# Patient Record
Sex: FEMALE | Race: OTHER | NOT HISPANIC OR LATINO
[De-identification: names, ages, dates, MRNs, and addresses within clinical notes are randomized per-mention and may not be internally consistent; named-entity substitution may affect disease eponyms.]

---

## 2018-09-04 ENCOUNTER — APPOINTMENT (OUTPATIENT)
Dept: CARDIOLOGY | Facility: CLINIC | Age: 68
End: 2018-09-04
Payer: MEDICARE

## 2018-09-04 ENCOUNTER — NON-APPOINTMENT (OUTPATIENT)
Age: 68
End: 2018-09-04

## 2018-09-04 VITALS
HEIGHT: 68 IN | OXYGEN SATURATION: 97 % | HEART RATE: 108 BPM | DIASTOLIC BLOOD PRESSURE: 70 MMHG | WEIGHT: 205 LBS | SYSTOLIC BLOOD PRESSURE: 110 MMHG | BODY MASS INDEX: 31.07 KG/M2

## 2018-09-04 DIAGNOSIS — M54.2 CERVICALGIA: ICD-10-CM

## 2018-09-04 PROCEDURE — 99214 OFFICE O/P EST MOD 30 MIN: CPT

## 2018-09-04 PROCEDURE — 93000 ELECTROCARDIOGRAM COMPLETE: CPT

## 2018-09-04 PROCEDURE — 93880 EXTRACRANIAL BILAT STUDY: CPT

## 2018-09-04 PROCEDURE — 93306 TTE W/DOPPLER COMPLETE: CPT

## 2018-09-04 RX ORDER — ECONAZOLE NITRATE 10 MG/G
1 CREAM TOPICAL
Qty: 85 | Refills: 0 | Status: COMPLETED | COMMUNITY
Start: 2018-04-18

## 2018-09-04 RX ORDER — AZITHROMYCIN 250 MG/1
250 TABLET, FILM COATED ORAL
Qty: 6 | Refills: 0 | Status: COMPLETED | COMMUNITY
Start: 2018-03-08

## 2018-09-04 RX ORDER — FLUTICASONE PROPIONATE 50 UG/1
50 SPRAY, METERED NASAL
Qty: 16 | Refills: 0 | Status: ACTIVE | COMMUNITY
Start: 2018-06-18

## 2018-09-21 ENCOUNTER — RESULT REVIEW (OUTPATIENT)
Age: 68
End: 2018-09-21

## 2019-10-15 ENCOUNTER — APPOINTMENT (OUTPATIENT)
Dept: CARDIOLOGY | Facility: CLINIC | Age: 69
End: 2019-10-15
Payer: MEDICARE

## 2019-10-15 VITALS
OXYGEN SATURATION: 98 % | HEIGHT: 68 IN | HEART RATE: 86 BPM | SYSTOLIC BLOOD PRESSURE: 110 MMHG | BODY MASS INDEX: 30.92 KG/M2 | WEIGHT: 204 LBS | DIASTOLIC BLOOD PRESSURE: 70 MMHG

## 2019-10-15 PROCEDURE — 99215 OFFICE O/P EST HI 40 MIN: CPT

## 2019-10-15 PROCEDURE — 93000 ELECTROCARDIOGRAM COMPLETE: CPT

## 2019-10-15 NOTE — PHYSICAL EXAM
[General Appearance - Well Developed] : well developed [Normal Appearance] : normal appearance [Well Groomed] : well groomed [General Appearance - Well Nourished] : well nourished [General Appearance - In No Acute Distress] : no acute distress [No Deformities] : no deformities [Eyelids - No Xanthelasma] : the eyelids demonstrated no xanthelasmas [Normal Conjunctiva] : the conjunctiva exhibited no abnormalities [No Oral Cyanosis] : no oral cyanosis [Normal Oral Mucosa] : normal oral mucosa [No Oral Pallor] : no oral pallor [Normal Jugular Venous A Waves Present] : normal jugular venous A waves present [Normal Jugular Venous V Waves Present] : normal jugular venous V waves present [No Jugular Venous Begum A Waves] : no jugular venous begum A waves [Exaggerated Use Of Accessory Muscles For Inspiration] : no accessory muscle use [Respiration, Rhythm And Depth] : normal respiratory rhythm and effort [Auscultation Breath Sounds / Voice Sounds] : lungs were clear to auscultation bilaterally [Heart Rate And Rhythm] : heart rate and rhythm were normal [Heart Sounds] : normal S1 and S2 [Murmurs] : no murmurs present [Abdomen Soft] : soft [Abdomen Tenderness] : non-tender [Abnormal Walk] : normal gait [Abdomen Mass (___ Cm)] : no abdominal mass palpated [Gait - Sufficient For Exercise Testing] : the gait was sufficient for exercise testing [Nail Clubbing] : no clubbing of the fingernails [Petechial Hemorrhages (___cm)] : no petechial hemorrhages [Cyanosis, Localized] : no localized cyanosis [] : no rash [Skin Color & Pigmentation] : normal skin color and pigmentation [No Venous Stasis] : no venous stasis [Skin Lesions] : no skin lesions [No Skin Ulcers] : no skin ulcer [No Xanthoma] : no  xanthoma was observed [Oriented To Time, Place, And Person] : oriented to person, place, and time [No Anxiety] : not feeling anxious [Affect] : the affect was normal [Mood] : the mood was normal

## 2019-10-15 NOTE — HISTORY OF PRESENT ILLNESS
[FreeTextEntry1] : Mrs. Brewster is followed for routine medical care in our office.\par Known history of hyperlipidemia and migraine headaches. She is being seen by neurology for her migraine headaches and is on Topamax and Imitrex as needed. Patient had evaluation done in the past because her sister has Brugada syndrome. She has had no prior history of coronary artery disease. She suffers from obesity and feels frustrated that despite her dieting she is unable to dramatically curb her weight gain.\par Also c/o occasional neck pinches when lying in bed at night, likely cervical arthritis.\par

## 2019-10-15 NOTE — DISCUSSION/SUMMARY
[___ Month(s)] : [unfilled] month(s) [FreeTextEntry1] : Labs reviewed, normal.Lengthy discussion regarding options for weight loss, patient requesting pharmacological aid. Patient already on Topamax for headaches will try limited course of Lorcaserin with a goal of 5% weight loss. Patient will continue dietary modification and exercise to whatever capacity she is able to followup in 12weeks for evaluation. Patient was told that this will not dramatically alter her body habitus.\par

## 2019-10-15 NOTE — CARDIOLOGY SUMMARY
[No Exercise Ind Arr] : no exercise induced arrhythmias [No Ischemia] : no Ischemia [No Symptoms] : no Symptoms [LVEF ___%] : LVEF [unfilled]% [___] : [unfilled]

## 2020-01-08 ENCOUNTER — MEDICATION RENEWAL (OUTPATIENT)
Age: 70
End: 2020-01-08

## 2020-02-18 ENCOUNTER — NON-APPOINTMENT (OUTPATIENT)
Age: 70
End: 2020-02-18

## 2020-02-18 ENCOUNTER — APPOINTMENT (OUTPATIENT)
Dept: CARDIOLOGY | Facility: CLINIC | Age: 70
End: 2020-02-18
Payer: MEDICARE

## 2020-02-18 VITALS
BODY MASS INDEX: 30.01 KG/M2 | SYSTOLIC BLOOD PRESSURE: 120 MMHG | HEART RATE: 83 BPM | DIASTOLIC BLOOD PRESSURE: 62 MMHG | OXYGEN SATURATION: 98 % | WEIGHT: 198 LBS | HEIGHT: 68 IN

## 2020-02-18 PROCEDURE — 99214 OFFICE O/P EST MOD 30 MIN: CPT

## 2020-02-18 PROCEDURE — 93000 ELECTROCARDIOGRAM COMPLETE: CPT

## 2020-02-18 RX ORDER — METRONIDAZOLE 7.5 MG/G
0.75 LOTION TOPICAL
Qty: 59 | Refills: 0 | Status: DISCONTINUED | COMMUNITY
Start: 2018-07-18 | End: 2020-02-18

## 2020-02-18 NOTE — DISCUSSION/SUMMARY
[___ Month(s)] : [unfilled] month(s) [FreeTextEntry1] : Atypical chest pain, likely musculoskeletal.  Will schedule exercise nuclear stress testing for further risk stratification.  Patient encouraged to continue dietary modification and continue monitoring her diet closely.  Labs from emergency room were reviewed with the patient and were otherwise normal.

## 2020-02-18 NOTE — PHYSICAL EXAM
[General Appearance - Well Developed] : well developed [Well Groomed] : well groomed [Normal Appearance] : normal appearance [General Appearance - Well Nourished] : well nourished [No Deformities] : no deformities [General Appearance - In No Acute Distress] : no acute distress [Normal Conjunctiva] : the conjunctiva exhibited no abnormalities [Eyelids - No Xanthelasma] : the eyelids demonstrated no xanthelasmas [Normal Oral Mucosa] : normal oral mucosa [No Oral Pallor] : no oral pallor [No Oral Cyanosis] : no oral cyanosis [Normal Jugular Venous A Waves Present] : normal jugular venous A waves present [Normal Jugular Venous V Waves Present] : normal jugular venous V waves present [No Jugular Venous Begum A Waves] : no jugular venous begum A waves [Respiration, Rhythm And Depth] : normal respiratory rhythm and effort [Exaggerated Use Of Accessory Muscles For Inspiration] : no accessory muscle use [Auscultation Breath Sounds / Voice Sounds] : lungs were clear to auscultation bilaterally [Heart Sounds] : normal S1 and S2 [Heart Rate And Rhythm] : heart rate and rhythm were normal [Murmurs] : no murmurs present [Abdomen Soft] : soft [Abdomen Tenderness] : non-tender [Abdomen Mass (___ Cm)] : no abdominal mass palpated [Abnormal Walk] : normal gait [Gait - Sufficient For Exercise Testing] : the gait was sufficient for exercise testing [Cyanosis, Localized] : no localized cyanosis [Nail Clubbing] : no clubbing of the fingernails [Petechial Hemorrhages (___cm)] : no petechial hemorrhages [] : no rash [Skin Color & Pigmentation] : normal skin color and pigmentation [No Venous Stasis] : no venous stasis [No Skin Ulcers] : no skin ulcer [Skin Lesions] : no skin lesions [No Xanthoma] : no  xanthoma was observed [Affect] : the affect was normal [Oriented To Time, Place, And Person] : oriented to person, place, and time [Mood] : the mood was normal [No Anxiety] : not feeling anxious

## 2020-02-18 NOTE — HISTORY OF PRESENT ILLNESS
[FreeTextEntry1] : Mrs. Brewster is a 68 yo female followed for h/o obesity, hyperlipidemia and migraine headaches. She is being seen by neurology for her migraine headaches and is on Topamax and Imitrex as needed. \par Patient had cardiac evaluation done in the past because her sister has Brugada syndrome. Some weight los since last visit which she attributes to her Belviq.\par Seen this weekend at [facility] ED for atypical chest pain, +pleuritic and non-radiating. Released after r/o MI.No further complaints. Says she is walking actively.

## 2020-03-18 ENCOUNTER — APPOINTMENT (OUTPATIENT)
Dept: CARDIOLOGY | Facility: CLINIC | Age: 70
End: 2020-03-18

## 2020-06-24 ENCOUNTER — APPOINTMENT (OUTPATIENT)
Dept: CARDIOLOGY | Facility: CLINIC | Age: 70
End: 2020-06-24
Payer: MEDICARE

## 2020-06-24 ENCOUNTER — MED ADMIN CHARGE (OUTPATIENT)
Age: 70
End: 2020-06-24

## 2020-06-24 PROCEDURE — 78452 HT MUSCLE IMAGE SPECT MULT: CPT

## 2020-06-24 PROCEDURE — 93015 CV STRESS TEST SUPVJ I&R: CPT

## 2020-06-24 PROCEDURE — A9500: CPT

## 2020-06-24 RX ORDER — REGADENOSON 0.08 MG/ML
0.4 INJECTION, SOLUTION INTRAVENOUS
Qty: 1 | Refills: 0 | Status: COMPLETED | OUTPATIENT
Start: 2020-06-24

## 2020-06-24 RX ADMIN — REGADENOSON 4 MG/5ML: 0.08 INJECTION, SOLUTION INTRAVENOUS at 00:00

## 2021-03-02 ENCOUNTER — APPOINTMENT (OUTPATIENT)
Dept: CARDIOLOGY | Facility: CLINIC | Age: 71
End: 2021-03-02
Payer: MEDICARE

## 2021-03-02 ENCOUNTER — NON-APPOINTMENT (OUTPATIENT)
Age: 71
End: 2021-03-02

## 2021-03-02 VITALS
SYSTOLIC BLOOD PRESSURE: 130 MMHG | DIASTOLIC BLOOD PRESSURE: 80 MMHG | HEART RATE: 90 BPM | TEMPERATURE: 97.8 F | HEIGHT: 68 IN | WEIGHT: 178 LBS | OXYGEN SATURATION: 97 % | BODY MASS INDEX: 26.98 KG/M2

## 2021-03-02 DIAGNOSIS — F41.9 ANXIETY DISORDER, UNSPECIFIED: ICD-10-CM

## 2021-03-02 PROCEDURE — 93000 ELECTROCARDIOGRAM COMPLETE: CPT | Mod: 59

## 2021-03-02 PROCEDURE — 93242 EXT ECG>48HR<7D RECORDING: CPT

## 2021-03-02 PROCEDURE — 99214 OFFICE O/P EST MOD 30 MIN: CPT

## 2021-03-02 RX ORDER — DICLOFENAC SODIUM 1% 10 MG/G
1 GEL TOPICAL
Qty: 300 | Refills: 0 | Status: ACTIVE | COMMUNITY
Start: 2020-09-14

## 2021-03-02 RX ORDER — LEVOTHYROXINE SODIUM 0.15 MG/1
150 TABLET ORAL DAILY
Qty: 90 | Refills: 3 | Status: ACTIVE | COMMUNITY

## 2021-03-02 RX ORDER — GABAPENTIN 100 MG/1
100 CAPSULE ORAL
Qty: 30 | Refills: 0 | Status: COMPLETED | COMMUNITY
Start: 2020-09-14

## 2021-03-02 RX ORDER — LORCASERIN HYDROCHLORIDE HEMIHYDRATE 20 MG/1
20 TABLET, FILM COATED, EXTENDED RELEASE ORAL DAILY
Qty: 30 | Refills: 2 | Status: DISCONTINUED | COMMUNITY
Start: 2019-10-15 | End: 2021-03-02

## 2021-03-02 RX ORDER — LEVOTHYROXINE SODIUM 150 UG/1
150 TABLET ORAL
Qty: 90 | Refills: 0 | Status: COMPLETED | COMMUNITY
Start: 2020-12-19

## 2021-03-02 NOTE — PHYSICAL EXAM
[General Appearance - Well Developed] : well developed [Normal Appearance] : normal appearance [Well Groomed] : well groomed [General Appearance - Well Nourished] : well nourished [No Deformities] : no deformities [General Appearance - In No Acute Distress] : no acute distress [Normal Conjunctiva] : the conjunctiva exhibited no abnormalities [Eyelids - No Xanthelasma] : the eyelids demonstrated no xanthelasmas [Normal Oral Mucosa] : normal oral mucosa [No Oral Pallor] : no oral pallor [No Oral Cyanosis] : no oral cyanosis [Normal Jugular Venous A Waves Present] : normal jugular venous A waves present [Normal Jugular Venous V Waves Present] : normal jugular venous V waves present [No Jugular Venous Begum A Waves] : no jugular venous begum A waves [Respiration, Rhythm And Depth] : normal respiratory rhythm and effort [Exaggerated Use Of Accessory Muscles For Inspiration] : no accessory muscle use [Auscultation Breath Sounds / Voice Sounds] : lungs were clear to auscultation bilaterally [Heart Rate And Rhythm] : heart rate and rhythm were normal [Heart Sounds] : normal S1 and S2 [Murmurs] : no murmurs present [Abdomen Soft] : soft [Abdomen Tenderness] : non-tender [Abdomen Mass (___ Cm)] : no abdominal mass palpated [Abnormal Walk] : normal gait [Gait - Sufficient For Exercise Testing] : the gait was sufficient for exercise testing [Nail Clubbing] : no clubbing of the fingernails [Cyanosis, Localized] : no localized cyanosis [Petechial Hemorrhages (___cm)] : no petechial hemorrhages [Skin Color & Pigmentation] : normal skin color and pigmentation [] : no rash [No Venous Stasis] : no venous stasis [Skin Lesions] : no skin lesions [No Skin Ulcers] : no skin ulcer [No Xanthoma] : no  xanthoma was observed [Oriented To Time, Place, And Person] : oriented to person, place, and time [Affect] : the affect was normal [Mood] : the mood was normal [No Anxiety] : not feeling anxious

## 2021-03-02 NOTE — DISCUSSION/SUMMARY
[___ Month(s)] : [unfilled] month(s) [FreeTextEntry1] : 62-year-old female with history of hyperlipidemia and obesity.  Patient has lost more than 30 pounds through concerted dieting and increased physical activity.  She is also been complaining of increased episodes of heart fluttering which she attributes to anxiety and stress during the Covid pandemic.  Will reaffirm that this is not due to any significant dysrhythmia (especially given family history of Brugada syndrome).  Will obtain echocardiogram to reevaluate left ventricular and left atrial function.  An extended Holter monitor will be placed.  If the above testing shows no evidence of any significant dysrhythmia then would consider antianxiolytic therapies such as clinical psychology evaluation and/or SSRI.  Also, increased palpitations may be due to relative hyperthyroidism in the setting of increased thyroid supplements and weight loss; will try to obtain labs from her endocrinologist for review and would consider reevaluation as soon as feasible.

## 2021-03-02 NOTE — HISTORY OF PRESENT ILLNESS
[FreeTextEntry1] : Mrs. Brewster is a 69 yo female followed for h/o obesity, hyperlipidemia and migraine headaches. She is being seen by neurology for her migraine headaches and is on Topamax and Imitrex as needed. \par Patient had cardiac evaluation done in the past because her sister has Brugada syndrome. \par \par c/o increasing episodes of heart fluttering, on average once weekly. No other constitutional complaints noted.\par She has recently increased her Synthroid as per her endocrinologist, labs not available for review.\par  \par

## 2021-04-07 LAB
ALBUMIN SERPL ELPH-MCNC: 4.5 G/DL
ALP BLD-CCNC: 82 U/L
ALT SERPL-CCNC: 18 U/L
ANION GAP SERPL CALC-SCNC: 12 MMOL/L
AST SERPL-CCNC: 20 U/L
BASOPHILS # BLD AUTO: 0.04 K/UL
BASOPHILS NFR BLD AUTO: 0.6 %
BILIRUB SERPL-MCNC: 0.3 MG/DL
BUN SERPL-MCNC: 23 MG/DL
CALCIUM SERPL-MCNC: 10.1 MG/DL
CHLORIDE SERPL-SCNC: 107 MMOL/L
CHOLEST SERPL-MCNC: 196 MG/DL
CO2 SERPL-SCNC: 26 MMOL/L
CREAT SERPL-MCNC: 0.75 MG/DL
EOSINOPHIL # BLD AUTO: 0.08 K/UL
EOSINOPHIL NFR BLD AUTO: 1.2 %
ESTIMATED AVERAGE GLUCOSE: 105 MG/DL
GLUCOSE SERPL-MCNC: 102 MG/DL
HBA1C MFR BLD HPLC: 5.3 %
HCT VFR BLD CALC: 41 %
HDLC SERPL-MCNC: 65 MG/DL
HGB BLD-MCNC: 12.8 G/DL
IMM GRANULOCYTES NFR BLD AUTO: 0.2 %
LDLC SERPL CALC-MCNC: 116 MG/DL
LYMPHOCYTES # BLD AUTO: 2.08 K/UL
LYMPHOCYTES NFR BLD AUTO: 32.4 %
MAGNESIUM SERPL-MCNC: 2.2 MG/DL
MAN DIFF?: NORMAL
MCHC RBC-ENTMCNC: 29.7 PG
MCHC RBC-ENTMCNC: 31.2 GM/DL
MCV RBC AUTO: 95.1 FL
MONOCYTES # BLD AUTO: 0.47 K/UL
MONOCYTES NFR BLD AUTO: 7.3 %
NEUTROPHILS # BLD AUTO: 3.73 K/UL
NEUTROPHILS NFR BLD AUTO: 58.3 %
NONHDLC SERPL-MCNC: 131 MG/DL
PLATELET # BLD AUTO: 202 K/UL
POTASSIUM SERPL-SCNC: 5.4 MMOL/L
PROT SERPL-MCNC: 7.2 G/DL
RBC # BLD: 4.31 M/UL
RBC # FLD: 14.8 %
SODIUM SERPL-SCNC: 145 MMOL/L
T3FREE SERPL-MCNC: 1.9 PG/ML
T4 FREE SERPL-MCNC: 1.4 NG/DL
TRIGL SERPL-MCNC: 76 MG/DL
TSH SERPL-ACNC: 1.49 UIU/ML
WBC # FLD AUTO: 6.41 K/UL

## 2021-04-15 ENCOUNTER — NON-APPOINTMENT (OUTPATIENT)
Age: 71
End: 2021-04-15

## 2021-05-25 ENCOUNTER — APPOINTMENT (OUTPATIENT)
Dept: ELECTROPHYSIOLOGY | Facility: CLINIC | Age: 71
End: 2021-05-25

## 2021-07-20 ENCOUNTER — APPOINTMENT (OUTPATIENT)
Dept: CARDIOLOGY | Facility: CLINIC | Age: 71
End: 2021-07-20
Payer: MEDICARE

## 2021-07-20 ENCOUNTER — NON-APPOINTMENT (OUTPATIENT)
Age: 71
End: 2021-07-20

## 2021-07-20 VITALS
TEMPERATURE: 98.4 F | WEIGHT: 160 LBS | OXYGEN SATURATION: 98 % | BODY MASS INDEX: 24.25 KG/M2 | HEART RATE: 83 BPM | DIASTOLIC BLOOD PRESSURE: 70 MMHG | SYSTOLIC BLOOD PRESSURE: 120 MMHG | HEIGHT: 68 IN

## 2021-07-20 DIAGNOSIS — R00.2 PALPITATIONS: ICD-10-CM

## 2021-07-20 PROCEDURE — 93000 ELECTROCARDIOGRAM COMPLETE: CPT

## 2021-07-20 PROCEDURE — 99214 OFFICE O/P EST MOD 30 MIN: CPT

## 2021-07-20 RX ORDER — TERBINAFINE HYDROCHLORIDE 250 MG/1
250 TABLET ORAL
Qty: 30 | Refills: 0 | Status: DISCONTINUED | COMMUNITY
Start: 2020-09-08 | End: 2021-07-20

## 2021-07-20 NOTE — DISCUSSION/SUMMARY
[___ Month(s)] : in [unfilled] month(s) [FreeTextEntry1] : 62-year-old female with history of hyperlipidemia and obesity.  Patient has lost 45 pounds through concerted dieting and increased physical activity.  Continue current Rx.\par Diet and exercise.\par ILR monitored at [facility]

## 2021-07-20 NOTE — CARDIOLOGY SUMMARY
[No Ischemia] : no Ischemia [No Exercise Ind Arr] : no exercise induced arrhythmias [No Symptoms] : no Symptoms [___] : [unfilled] [LVEF ___%] : LVEF [unfilled]% [de-identified] : 7/20/21, Sinus  Rhythm  - frequent ectopic ventricular beat s \par -RSR(V1) -nondiagnostic. \par  -Left atrial enlargement.  [de-identified] : 6/16/21 - Medtronic LINQ placed

## 2021-07-20 NOTE — HISTORY OF PRESENT ILLNESS
[FreeTextEntry1] : Mrs. Brewster is a 69 yo female followed for h/o obesity, hyperlipidemia and migraine headaches. She is being seen by neurology for her migraine headaches and is on Topamax and Imitrex as needed. \par Patient had cardiac evaluation done in the past because her sister has Brugada syndrome. \par \par Still c/o rare episodes of heart fluttering. No other constitutional complaints noted.\par She has recently increased her Synthroid as per her endocrinologist, labs not available for review.\par \par Had ILR placed by Dr. Nolan at [facility] in 5/21. No reports available.\par  \par

## 2022-01-25 ENCOUNTER — APPOINTMENT (OUTPATIENT)
Dept: CARDIOLOGY | Facility: CLINIC | Age: 72
End: 2022-01-25
Payer: MEDICARE

## 2022-01-25 ENCOUNTER — NON-APPOINTMENT (OUTPATIENT)
Age: 72
End: 2022-01-25

## 2022-01-25 VITALS
OXYGEN SATURATION: 98 % | SYSTOLIC BLOOD PRESSURE: 114 MMHG | DIASTOLIC BLOOD PRESSURE: 60 MMHG | WEIGHT: 163 LBS | HEART RATE: 86 BPM | BODY MASS INDEX: 24.71 KG/M2 | TEMPERATURE: 98 F | HEIGHT: 68 IN

## 2022-01-25 DIAGNOSIS — M54.50 LOW BACK PAIN, UNSPECIFIED: ICD-10-CM

## 2022-01-25 PROCEDURE — 93000 ELECTROCARDIOGRAM COMPLETE: CPT

## 2022-01-25 PROCEDURE — 99213 OFFICE O/P EST LOW 20 MIN: CPT

## 2022-01-25 RX ORDER — PREDNISONE 20 MG/1
20 TABLET ORAL
Qty: 18 | Refills: 0 | Status: COMPLETED | COMMUNITY
Start: 2021-09-06

## 2022-01-25 RX ORDER — ALPRAZOLAM 0.25 MG/1
0.25 TABLET ORAL
Qty: 5 | Refills: 0 | Status: ACTIVE | COMMUNITY
Start: 2022-01-25 | End: 1900-01-01

## 2022-01-25 NOTE — DISCUSSION/SUMMARY
[___ Month(s)] : in [unfilled] month(s) [FreeTextEntry1] : 62-year-old female with history of hyperlipidemia and obesity.  Patient has lost 50 pounds through concerted dieting and increased physical activity.  Continue current Rx.\par Frequent PVC's/ventricular bigeminy - followed with ILR.\par ILR monitored at [facility], will request copy of ILR interrogations, at patient request.\par Scheduled for MRI of spine, requesting mild sedative prior to study because of anxiety.\par \par Labs ordered.\par

## 2022-01-25 NOTE — CARDIOLOGY SUMMARY
[No Ischemia] : no Ischemia [No Exercise Ind Arr] : no exercise induced arrhythmias [No Symptoms] : no Symptoms [___] : [unfilled] [LVEF ___%] : LVEF [unfilled]% [de-identified] : 1/25/22, Sinus Rhythm -Frequent pvcs -ventricular bigeminy, Low voltage in precordial leads. -RSR(V1) -nondiagnostic. -Left axis -anterior fascicular block.\par 7/20/21, Sinus  Rhythm  - frequent ectopic ventricular beats, -RSR(V1) -nondiagnostic.  -Left atrial enlargement.  [de-identified] : 6/16/21 - Medtronic LINQ placed

## 2022-01-25 NOTE — HISTORY OF PRESENT ILLNESS
[FreeTextEntry1] : Mrs. Brewster is a 70 yo female followed for h/o obesity, hyperlipidemia and migraine headaches. She is being seen by neurology for her migraine headaches and is on Topamax and Imitrex as needed. \par Patient had cardiac evaluation done in the past because her sister has Brugada syndrome. Had c/o heart fluttering and had ILR placed by Dr. Nolan at [facility] in 5/21. No reports available.\par \par Still c/o rare episodes of heart fluttering. No other constitutional complaints noted.\par She has recently increased her Synthroid as per her endocrinologist, no recent labs available for review.\par \par \par  \par

## 2022-01-27 LAB
ALBUMIN SERPL ELPH-MCNC: 4.6 G/DL
ALP BLD-CCNC: 81 U/L
ALT SERPL-CCNC: 18 U/L
ANION GAP SERPL CALC-SCNC: 14 MMOL/L
AST SERPL-CCNC: 19 U/L
BASOPHILS # BLD AUTO: 0.03 K/UL
BASOPHILS NFR BLD AUTO: 0.5 %
BILIRUB SERPL-MCNC: 0.3 MG/DL
BUN SERPL-MCNC: 23 MG/DL
CALCIUM SERPL-MCNC: 10.1 MG/DL
CHLORIDE SERPL-SCNC: 104 MMOL/L
CHOLEST SERPL-MCNC: 228 MG/DL
CO2 SERPL-SCNC: 23 MMOL/L
CREAT SERPL-MCNC: 0.71 MG/DL
EOSINOPHIL # BLD AUTO: 0.08 K/UL
EOSINOPHIL NFR BLD AUTO: 1.5 %
ESTIMATED AVERAGE GLUCOSE: 100 MG/DL
GLUCOSE SERPL-MCNC: 87 MG/DL
HBA1C MFR BLD HPLC: 5.1 %
HCT VFR BLD CALC: 43.4 %
HDLC SERPL-MCNC: 74 MG/DL
HGB BLD-MCNC: 13.6 G/DL
IMM GRANULOCYTES NFR BLD AUTO: 0.2 %
LDLC SERPL CALC-MCNC: 140 MG/DL
LYMPHOCYTES # BLD AUTO: 1.78 K/UL
LYMPHOCYTES NFR BLD AUTO: 32.6 %
MAGNESIUM SERPL-MCNC: 2.1 MG/DL
MAN DIFF?: NORMAL
MCHC RBC-ENTMCNC: 30.7 PG
MCHC RBC-ENTMCNC: 31.3 GM/DL
MCV RBC AUTO: 98 FL
MONOCYTES # BLD AUTO: 0.44 K/UL
MONOCYTES NFR BLD AUTO: 8.1 %
NEUTROPHILS # BLD AUTO: 3.12 K/UL
NEUTROPHILS NFR BLD AUTO: 57.1 %
NONHDLC SERPL-MCNC: 154 MG/DL
PLATELET # BLD AUTO: 200 K/UL
POTASSIUM SERPL-SCNC: 4.6 MMOL/L
PROT SERPL-MCNC: 7.2 G/DL
RBC # BLD: 4.43 M/UL
RBC # FLD: 13.5 %
SODIUM SERPL-SCNC: 142 MMOL/L
T3FREE SERPL-MCNC: 2.07 PG/ML
T4 FREE SERPL-MCNC: 1.6 NG/DL
TRIGL SERPL-MCNC: 70 MG/DL
TSH SERPL-ACNC: 0.96 UIU/ML
WBC # FLD AUTO: 5.46 K/UL

## 2022-02-22 ENCOUNTER — APPOINTMENT (OUTPATIENT)
Dept: CARDIOLOGY | Facility: CLINIC | Age: 72
End: 2022-02-22
Payer: MEDICARE

## 2022-02-22 VITALS
OXYGEN SATURATION: 95 % | SYSTOLIC BLOOD PRESSURE: 120 MMHG | HEIGHT: 68 IN | DIASTOLIC BLOOD PRESSURE: 68 MMHG | HEART RATE: 75 BPM | BODY MASS INDEX: 23.95 KG/M2 | WEIGHT: 158 LBS

## 2022-02-22 DIAGNOSIS — Z86.39 PERSONAL HISTORY OF OTHER ENDOCRINE, NUTRITIONAL AND METABOLIC DISEASE: ICD-10-CM

## 2022-02-22 DIAGNOSIS — R07.89 OTHER CHEST PAIN: ICD-10-CM

## 2022-02-22 PROCEDURE — 99213 OFFICE O/P EST LOW 20 MIN: CPT

## 2022-02-22 NOTE — HISTORY OF PRESENT ILLNESS
[FreeTextEntry1] : Mrs. Brewster is a 70 yo female followed for h/o obesity, hyperlipidemia and migraine headaches. \par Patient had cardiac evaluation done in the past because her sister has Brugada syndrome. Had c/o heart fluttering and had ILR placed by Dr. Nolan at [facility] in 5/21. No recent reports available.\par c/o worsening thoracic spine pain - noted acute onset mid sternal chest pain yesterday that prompted a visit to [facility] ED where she was rules out for MI and told to follow with Cardiology.\par She continues to note that the chest pain radiated to the back, was sharp and positional, unaccompanied by diaphoresis, nausea or palpitations.\par \par \par \par \par  \par

## 2022-02-22 NOTE — DISCUSSION/SUMMARY
[___ Month(s)] : in [unfilled] month(s) [FreeTextEntry1] : 71-year-old female with atypical , likely neuromuscular pain from pinched nerve in back, being seen by ortho (had MRI - disc disease?). No interval ECG changes- will repeat stress testing but most likely not cardiac related.\par ILR monitored at [facility], will request copy of ILR interrogations, at patient request.\par Scheduled for MRI of spine, requesting mild sedative prior to study because of anxiety.\par \par Labs ordered.\par

## 2022-02-22 NOTE — CARDIOLOGY SUMMARY
[de-identified] : 2/21/22, Sinus rhythm, Left axis/LAHB, RSR (V1)- non diagnostic, no interval changes.\par 1/25/22, Sinus Rhythm -Frequent pvcs -ventricular bigeminy, Low voltage in precordial leads. -RSR(V1) -nondiagnostic. -Left axis -anterior fascicular block.\par 7/20/21, Sinus  Rhythm  - frequent ectopic ventricular beats, -RSR(V1) -nondiagnostic.  -Left atrial enlargement.  [de-identified] : 6/16/21 - Medtronic LINQ placed [No Ischemia] : no Ischemia [No Exercise Ind Arr] : no exercise induced arrhythmias [No Symptoms] : no Symptoms [___] : [unfilled] [LVEF ___%] : LVEF [unfilled]%

## 2022-04-07 ENCOUNTER — APPOINTMENT (OUTPATIENT)
Dept: CARDIOLOGY | Facility: CLINIC | Age: 72
End: 2022-04-07
Payer: MEDICARE

## 2022-04-07 PROCEDURE — 78452 HT MUSCLE IMAGE SPECT MULT: CPT

## 2022-04-07 PROCEDURE — 93015 CV STRESS TEST SUPVJ I&R: CPT

## 2022-04-07 PROCEDURE — A9500: CPT

## 2022-08-02 ENCOUNTER — APPOINTMENT (OUTPATIENT)
Dept: CARDIOLOGY | Facility: CLINIC | Age: 72
End: 2022-08-02

## 2022-09-02 ENCOUNTER — APPOINTMENT (OUTPATIENT)
Dept: CARDIOLOGY | Facility: CLINIC | Age: 72
End: 2022-09-02

## 2022-09-02 ENCOUNTER — NON-APPOINTMENT (OUTPATIENT)
Age: 72
End: 2022-09-02

## 2022-09-02 VITALS
WEIGHT: 165 LBS | HEIGHT: 68 IN | DIASTOLIC BLOOD PRESSURE: 80 MMHG | OXYGEN SATURATION: 98 % | HEART RATE: 90 BPM | SYSTOLIC BLOOD PRESSURE: 136 MMHG | TEMPERATURE: 97.6 F | BODY MASS INDEX: 25.01 KG/M2

## 2022-09-02 VITALS — SYSTOLIC BLOOD PRESSURE: 122 MMHG | DIASTOLIC BLOOD PRESSURE: 76 MMHG

## 2022-09-02 DIAGNOSIS — M19.90 UNSPECIFIED OSTEOARTHRITIS, UNSPECIFIED SITE: ICD-10-CM

## 2022-09-02 PROCEDURE — 99213 OFFICE O/P EST LOW 20 MIN: CPT

## 2022-09-02 PROCEDURE — 93000 ELECTROCARDIOGRAM COMPLETE: CPT

## 2022-09-02 NOTE — CARDIOLOGY SUMMARY
[___] : [unfilled] [LVEF ___%] : LVEF [unfilled]% [de-identified] : 9/2/22, Sinus Rhythm, -RSR(V1) -nondiagnostic. -Left axis -anterior fascicular block.\par 2/21/22, Sinus rhythm, Left axis/LAHB, RSR (V1)- non diagnostic, no interval changes.\par 1/25/22, Sinus Rhythm -Frequent pvcs -ventricular bigeminy, Low voltage in precordial leads. -RSR(V1) -nondiagnostic. -Left axis -anterior fascicular block.\par 7/20/21, Sinus  Rhythm  - frequent ectopic ventricular beats, -RSR(V1) -nondiagnostic.  -Left atrial enlargement.  [de-identified] : 6/16/21 - Medtronic LINQ placed [de-identified] : 4/7/22, Normal ex nuc stress test

## 2022-09-02 NOTE — HISTORY OF PRESENT ILLNESS
[FreeTextEntry1] : Mrs. Brewster is a 70 yo female followed for h/o obesity, hyperlipidemia and migraine headaches. \par Patient had cardiac evaluation done in the past because her sister has Brugada syndrome. Had c/o heart fluttering and had ILR placed by Dr. Nolan at [facility] in 5/21. \par Resolved thoracic spine pain.\par \par MAy need surgery for bunionectomy/hammer toe revision.\par \par \par  \par

## 2022-09-02 NOTE — DISCUSSION/SUMMARY
[___ Month(s)] : in [unfilled] month(s) [FreeTextEntry1] : 71-year-old female with atypical , likely neuromuscular pain from pinched nerve in back, being seen by ortho (had MRI - disc disease?). No interval ECG changes- will repeat stress testing but most likely not cardiac related.\par ILR monitored at [facility], will request copy of ILR interrogations, at patient request.\par Labs f/u at endocrinologist.\par  [EKG obtained to assist in diagnosis and management of assessed problem(s)] : EKG obtained to assist in diagnosis and management of assessed problem(s)

## 2022-09-20 ENCOUNTER — OUTPATIENT (OUTPATIENT)
Dept: OUTPATIENT SERVICES | Facility: HOSPITAL | Age: 72
LOS: 1 days | End: 2022-09-20
Payer: MEDICARE

## 2022-09-20 VITALS
DIASTOLIC BLOOD PRESSURE: 78 MMHG | WEIGHT: 166.67 LBS | HEART RATE: 81 BPM | SYSTOLIC BLOOD PRESSURE: 118 MMHG | TEMPERATURE: 98 F | OXYGEN SATURATION: 97 % | RESPIRATION RATE: 16 BRPM | HEIGHT: 67 IN

## 2022-09-20 DIAGNOSIS — M20.12 HALLUX VALGUS (ACQUIRED), LEFT FOOT: ICD-10-CM

## 2022-09-20 DIAGNOSIS — Z98.890 OTHER SPECIFIED POSTPROCEDURAL STATES: Chronic | ICD-10-CM

## 2022-09-20 DIAGNOSIS — G43.909 MIGRAINE, UNSPECIFIED, NOT INTRACTABLE, WITHOUT STATUS MIGRAINOSUS: ICD-10-CM

## 2022-09-20 DIAGNOSIS — Z90.710 ACQUIRED ABSENCE OF BOTH CERVIX AND UTERUS: Chronic | ICD-10-CM

## 2022-09-20 DIAGNOSIS — M19.90 UNSPECIFIED OSTEOARTHRITIS, UNSPECIFIED SITE: ICD-10-CM

## 2022-09-20 DIAGNOSIS — Z01.818 ENCOUNTER FOR OTHER PREPROCEDURAL EXAMINATION: ICD-10-CM

## 2022-09-20 DIAGNOSIS — E03.9 HYPOTHYROIDISM, UNSPECIFIED: ICD-10-CM

## 2022-09-20 DIAGNOSIS — M20.42 OTHER HAMMER TOE(S) (ACQUIRED), LEFT FOOT: ICD-10-CM

## 2022-09-20 DIAGNOSIS — Z95.818 PRESENCE OF OTHER CARDIAC IMPLANTS AND GRAFTS: ICD-10-CM

## 2022-09-20 DIAGNOSIS — M54.9 DORSALGIA, UNSPECIFIED: Chronic | ICD-10-CM

## 2022-09-20 DIAGNOSIS — Z90.49 ACQUIRED ABSENCE OF OTHER SPECIFIED PARTS OF DIGESTIVE TRACT: Chronic | ICD-10-CM

## 2022-09-20 DIAGNOSIS — M24.575 CONTRACTURE, LEFT FOOT: ICD-10-CM

## 2022-09-20 DIAGNOSIS — M20.5X2 OTHER DEFORMITIES OF TOE(S) (ACQUIRED), LEFT FOOT: ICD-10-CM

## 2022-09-20 LAB
ANION GAP SERPL CALC-SCNC: 4 MMOL/L — LOW (ref 5–17)
BUN SERPL-MCNC: 26 MG/DL — HIGH (ref 7–23)
CALCIUM SERPL-MCNC: 9.2 MG/DL — SIGNIFICANT CHANGE UP (ref 8.4–10.5)
CHLORIDE SERPL-SCNC: 105 MMOL/L — SIGNIFICANT CHANGE UP (ref 96–108)
CO2 SERPL-SCNC: 29 MMOL/L — SIGNIFICANT CHANGE UP (ref 22–31)
CREAT SERPL-MCNC: 0.7 MG/DL — SIGNIFICANT CHANGE UP (ref 0.5–1.3)
EGFR: 94 ML/MIN/1.73M2 — SIGNIFICANT CHANGE UP
GLUCOSE SERPL-MCNC: 88 MG/DL — SIGNIFICANT CHANGE UP (ref 70–99)
HCT VFR BLD CALC: 40.2 % — SIGNIFICANT CHANGE UP (ref 34.5–45)
HGB BLD-MCNC: 13.2 G/DL — SIGNIFICANT CHANGE UP (ref 11.5–15.5)
MCHC RBC-ENTMCNC: 30.8 PG — SIGNIFICANT CHANGE UP (ref 27–34)
MCHC RBC-ENTMCNC: 32.8 GM/DL — SIGNIFICANT CHANGE UP (ref 32–36)
MCV RBC AUTO: 93.9 FL — SIGNIFICANT CHANGE UP (ref 80–100)
NRBC # BLD: 0 /100 WBCS — SIGNIFICANT CHANGE UP (ref 0–0)
PLATELET # BLD AUTO: 212 K/UL — SIGNIFICANT CHANGE UP (ref 150–400)
POTASSIUM SERPL-MCNC: 4.6 MMOL/L — SIGNIFICANT CHANGE UP (ref 3.5–5.3)
POTASSIUM SERPL-SCNC: 4.6 MMOL/L — SIGNIFICANT CHANGE UP (ref 3.5–5.3)
RBC # BLD: 4.28 M/UL — SIGNIFICANT CHANGE UP (ref 3.8–5.2)
RBC # FLD: 13.2 % — SIGNIFICANT CHANGE UP (ref 10.3–14.5)
SARS-COV-2 RNA SPEC QL NAA+PROBE: SIGNIFICANT CHANGE UP
SODIUM SERPL-SCNC: 138 MMOL/L — SIGNIFICANT CHANGE UP (ref 135–145)
WBC # BLD: 5.47 K/UL — SIGNIFICANT CHANGE UP (ref 3.8–10.5)
WBC # FLD AUTO: 5.47 K/UL — SIGNIFICANT CHANGE UP (ref 3.8–10.5)

## 2022-09-20 PROCEDURE — U0003: CPT

## 2022-09-20 PROCEDURE — 85027 COMPLETE CBC AUTOMATED: CPT

## 2022-09-20 PROCEDURE — 80048 BASIC METABOLIC PNL TOTAL CA: CPT

## 2022-09-20 PROCEDURE — U0005: CPT

## 2022-09-20 PROCEDURE — G0463: CPT

## 2022-09-20 PROCEDURE — 36415 COLL VENOUS BLD VENIPUNCTURE: CPT

## 2022-09-20 NOTE — H&P PST ADULT - HISTORY OF PRESENT ILLNESS
72yo female with medical h/o Hypothyroid, Migraine HA, Osteoarthritis on Cymbalta, and Loop recorder implanted 5/2021 for pmhx palpitations - pt denies any recent palpitation, CP or SOB. Pt reports Bunion and Hammertoes, left foot and presents today for PST + Covidpcr test for scheduled Correction Hallux Valgus + Correction Hammertoes, Left Foot on 9/23/2022

## 2022-09-20 NOTE — H&P PST ADULT - NSANTHOSAYNRD_GEN_A_CORE
neck 14inches/No. BLAKE screening performed.  STOP BANG Legend: 0-2 = LOW Risk; 3-4 = INTERMEDIATE Risk; 5-8 = HIGH Risk

## 2022-09-20 NOTE — H&P PST ADULT - PROBLEM SELECTOR PLAN 1
Pre-op instructions given. Pt verbalized understanding  Chlorhexidine wash instructions given  Pt instructed to HOLD all NSAID, Herbal tea/supplements  Pending: M/C  for abnormal ecg  Pending: Covid pcr test/results

## 2022-09-20 NOTE — H&P PST ADULT - NSICDXPASTSURGICALHX_GEN_ALL_CORE_FT
PAST SURGICAL HISTORY:  Back pain with history of spinal surgery 1999, 2008 with hardware    H/O hernia repair incisional 1999    History of cholecystectomy 1995    History of D&C 1982 to 1985 (9 in total)    History of loop recorder 5/2021 h/o palpitation MEDTRONIC    S/P hysterectomy 1985

## 2022-09-20 NOTE — H&P PST ADULT - NEGATIVE MUSCULOSKELETAL SYMPTOMS
no arthralgia/no joint swelling/no myalgia/no muscle cramps/no muscle weakness/no stiffness/no neck pain/no arm pain L/no arm pain R

## 2022-09-20 NOTE — H&P PST ADULT - BLOOD TRANSFUSION, PREVIOUS, PROFILE
Medication(s) Requested: [email or web address]  Last office visit: 11/9/18  Last refill: 11/6/18  Is the patient due for refill of this medication(s): Yes  PDMP review: Criteria met. Forwarded to Physician/EILEEN for signature.     
no

## 2022-09-20 NOTE — H&P PST ADULT - NSICDXPASTMEDICALHX_GEN_ALL_CORE_FT
PAST MEDICAL HISTORY:  Bunion, left foot     Hammer toe of left foot     Hypothyroidism     Migraine     Osteoarthritis

## 2022-09-22 ENCOUNTER — TRANSCRIPTION ENCOUNTER (OUTPATIENT)
Age: 72
End: 2022-09-22

## 2022-09-23 ENCOUNTER — TRANSCRIPTION ENCOUNTER (OUTPATIENT)
Age: 72
End: 2022-09-23

## 2022-09-23 ENCOUNTER — OUTPATIENT (OUTPATIENT)
Dept: OUTPATIENT SERVICES | Facility: HOSPITAL | Age: 72
LOS: 1 days | End: 2022-09-23
Payer: MEDICARE

## 2022-09-23 ENCOUNTER — RESULT REVIEW (OUTPATIENT)
Age: 72
End: 2022-09-23

## 2022-09-23 VITALS
RESPIRATION RATE: 14 BRPM | HEIGHT: 67 IN | SYSTOLIC BLOOD PRESSURE: 100 MMHG | TEMPERATURE: 98 F | HEART RATE: 82 BPM | WEIGHT: 166.67 LBS | OXYGEN SATURATION: 97 % | DIASTOLIC BLOOD PRESSURE: 65 MMHG

## 2022-09-23 VITALS
SYSTOLIC BLOOD PRESSURE: 112 MMHG | HEART RATE: 87 BPM | DIASTOLIC BLOOD PRESSURE: 73 MMHG | RESPIRATION RATE: 20 BRPM | TEMPERATURE: 98 F | OXYGEN SATURATION: 97 %

## 2022-09-23 DIAGNOSIS — Z90.710 ACQUIRED ABSENCE OF BOTH CERVIX AND UTERUS: Chronic | ICD-10-CM

## 2022-09-23 DIAGNOSIS — M54.9 DORSALGIA, UNSPECIFIED: Chronic | ICD-10-CM

## 2022-09-23 DIAGNOSIS — M20.12 HALLUX VALGUS (ACQUIRED), LEFT FOOT: ICD-10-CM

## 2022-09-23 DIAGNOSIS — M20.5X2 OTHER DEFORMITIES OF TOE(S) (ACQUIRED), LEFT FOOT: ICD-10-CM

## 2022-09-23 DIAGNOSIS — Z90.49 ACQUIRED ABSENCE OF OTHER SPECIFIED PARTS OF DIGESTIVE TRACT: Chronic | ICD-10-CM

## 2022-09-23 DIAGNOSIS — M20.42 OTHER HAMMER TOE(S) (ACQUIRED), LEFT FOOT: ICD-10-CM

## 2022-09-23 DIAGNOSIS — Z98.890 OTHER SPECIFIED POSTPROCEDURAL STATES: Chronic | ICD-10-CM

## 2022-09-23 DIAGNOSIS — M24.575 CONTRACTURE, LEFT FOOT: ICD-10-CM

## 2022-09-23 PROCEDURE — 73620 X-RAY EXAM OF FOOT: CPT

## 2022-09-23 PROCEDURE — 73620 X-RAY EXAM OF FOOT: CPT | Mod: 26,LT

## 2022-09-23 PROCEDURE — 88311 DECALCIFY TISSUE: CPT | Mod: 26

## 2022-09-23 PROCEDURE — C1713: CPT

## 2022-09-23 PROCEDURE — 88311 DECALCIFY TISSUE: CPT

## 2022-09-23 PROCEDURE — 88304 TISSUE EXAM BY PATHOLOGIST: CPT

## 2022-09-23 PROCEDURE — 28299 COR HLX VLGS DOUBLE OSTEOT: CPT | Mod: LT

## 2022-09-23 PROCEDURE — 28232 INCISION OF TOE TENDON: CPT | Mod: T3

## 2022-09-23 PROCEDURE — 88304 TISSUE EXAM BY PATHOLOGIST: CPT | Mod: 26

## 2022-09-23 PROCEDURE — C1889: CPT

## 2022-09-23 PROCEDURE — 28285 REPAIR OF HAMMERTOE: CPT | Mod: T1

## 2022-09-23 DEVICE — WIRE K THRD TRC 0.045X6IN NS: Type: IMPLANTABLE DEVICE | Site: LEFT | Status: FUNCTIONAL

## 2022-09-23 DEVICE — SCREW CORTEX S-T 2.7X18MM: Type: IMPLANTABLE DEVICE | Site: LEFT | Status: FUNCTIONAL

## 2022-09-23 DEVICE — SCREW CORTEX S-T 2.7X16MM: Type: IMPLANTABLE DEVICE | Site: LEFT | Status: FUNCTIONAL

## 2022-09-23 DEVICE — SCREW CORT SELF TAP 2MM 16MM: Type: IMPLANTABLE DEVICE | Site: LEFT | Status: FUNCTIONAL

## 2022-09-23 RX ORDER — SODIUM CHLORIDE 9 MG/ML
1000 INJECTION, SOLUTION INTRAVENOUS
Refills: 0 | Status: DISCONTINUED | OUTPATIENT
Start: 2022-09-23 | End: 2022-09-23

## 2022-09-23 RX ORDER — ONDANSETRON 8 MG/1
4 TABLET, FILM COATED ORAL ONCE
Refills: 0 | Status: DISCONTINUED | OUTPATIENT
Start: 2022-09-23 | End: 2022-09-23

## 2022-09-23 RX ORDER — HYDROMORPHONE HYDROCHLORIDE 2 MG/ML
0.5 INJECTION INTRAMUSCULAR; INTRAVENOUS; SUBCUTANEOUS
Refills: 0 | Status: DISCONTINUED | OUTPATIENT
Start: 2022-09-23 | End: 2022-09-23

## 2022-09-23 RX ADMIN — SODIUM CHLORIDE 50 MILLILITER(S): 9 INJECTION, SOLUTION INTRAVENOUS at 06:46

## 2022-09-23 NOTE — ASU DISCHARGE PLAN (ADULT/PEDIATRIC) - PROCEDURE
Left foot Je, Naseem bunionectomy, 2nd MPJ release, tenotomy and capsulotomy 3,4,5, 2nd digit arthroplasty

## 2022-09-23 NOTE — BRIEF OPERATIVE NOTE - NSICDXBRIEFPROCEDURE_GEN_ALL_CORE_FT
PROCEDURES:  Je-Naseem bunionectomy 23-Sep-2022 09:08:20  Kristy Meredith  Arthroplasty, toe, PIP joint, for abbie 23-Sep-2022 09:08:32 2nd digit arthroplasty, left foot Kristy Meredith  Tenotomy, flexor, toe, open 23-Sep-2022 09:09:38 tenotomy and capsulotomy 3,4,5 Kristy Meredith

## 2022-09-23 NOTE — ASU DISCHARGE PLAN (ADULT/PEDIATRIC) - NURSING INSTRUCTIONS
Keep shoe on at all times. Eat lightly and avoid spicy foods today.  Take pain medication as ordered and take with food.    Keep foot dressing dry. Elevate foot and ice as needed. Walk on heel of left foot. Use cane until full sensation returns to the foot.

## 2022-09-23 NOTE — ASU DISCHARGE PLAN (ADULT/PEDIATRIC) - NS MD DC FALL RISK RISK
For information on Fall & Injury Prevention, visit: [email or web address] OR  [email or web address] OR  https://www.cdc.gov/steadi/patient.html

## 2022-09-23 NOTE — BRIEF OPERATIVE NOTE - OPERATION/FINDINGS
s/p Left foot Je, Naseem bunionectomy, 2nd MPJ release, 2nd digit arthroplasty, 3rd, 4th, 5th digit tenotomy and capsulotomy

## 2022-09-23 NOTE — ASU PATIENT PROFILE, ADULT - FALL HARM RISK - UNIVERSAL INTERVENTIONS
Bed in lowest position, wheels locked, appropriate side rails in place/Call bell, personal items and telephone in reach/Instruct patient to call for assistance before getting out of bed or chair/Non-slip footwear when patient is out of bed/[place] to call system/Physically safe environment - no spills, clutter or unnecessary equipment/Purposeful Proactive Rounding/Room/bathroom lighting operational, light cord in reach

## 2022-09-23 NOTE — BRIEF OPERATIVE NOTE - NSICDXBRIEFPOSTOP_GEN_ALL_CORE_FT
POST-OP DIAGNOSIS:  Bunion, left foot 23-Sep-2022 09:10:48  Kristy Meredithertoe of left foot 23-Sep-2022 09:10:57  Kristy Meredith

## 2022-09-23 NOTE — BRIEF OPERATIVE NOTE - NSICDXBRIEFPREOP_GEN_ALL_CORE_FT
PRE-OP DIAGNOSIS:  Bunion, left foot 23-Sep-2022 09:10:10  Kristy Meredith of left foot 23-Sep-2022 09:10:25 2nd, 3rd, 4th and 5th digit Kristy Robertson

## 2022-09-23 NOTE — ASU DISCHARGE PLAN (ADULT/PEDIATRIC) - CARE PROVIDER_API CALL
----- Message from Adalgisa Timmons sent at 8/18/2020  9:39 AM CDT -----  Contact: Pt spouse Clarisse@[phone]--  Needs Advice    Reason for call:--Questions about home health--        Communication Preference:--Clarisse--Spouse--[phone]--    Additional Information:PT calling to speak with the nurse regarding the information listed above. She would like to see what her option she can have help with the pt? Please call to advise.             Kristy Meredith (DPM)  Surgery  [facility] [place] [address]  Phone: [phone]  Fax: [phone]  Established Patient  Scheduled Appointment: 09/27/2022

## 2022-09-23 NOTE — ASU PATIENT PROFILE, ADULT - NSSUBSTANCEUSE_GEN_ALL_CORE_SD
denies street drug use/caffeine Dermal Autograft Text: The defect edges were debeveled with a #15c scalpel blade.  Given the location of the defect, shape of the defect and the proximity to free margins a dermal autograft was deemed most appropriate.  Using a sterile surgical marker, the primary defect shape was transferred to the donor site. The area thus outlined was incised deep to adipose tissue with a #15 scalpel blade.  The harvested graft was then trimmed of adipose and epidermal tissue until only dermis was left.  The skin graft was then placed in the primary defect and oriented appropriately.

## 2022-09-28 LAB — SURGICAL PATHOLOGY STUDY: SIGNIFICANT CHANGE UP

## 2022-11-02 PROBLEM — G43.909 MIGRAINE, UNSPECIFIED, NOT INTRACTABLE, WITHOUT STATUS MIGRAINOSUS: Chronic | Status: ACTIVE | Noted: 2022-09-20

## 2022-11-02 PROBLEM — E03.9 HYPOTHYROIDISM, UNSPECIFIED: Chronic | Status: ACTIVE | Noted: 2022-09-20

## 2022-11-02 PROBLEM — M19.90 UNSPECIFIED OSTEOARTHRITIS, UNSPECIFIED SITE: Chronic | Status: ACTIVE | Noted: 2022-09-20

## 2022-11-02 PROBLEM — M20.42 OTHER HAMMER TOE(S) (ACQUIRED), LEFT FOOT: Chronic | Status: ACTIVE | Noted: 2022-09-20

## 2022-11-02 PROBLEM — M21.612 BUNION OF LEFT FOOT: Chronic | Status: ACTIVE | Noted: 2022-09-20

## 2022-11-03 ENCOUNTER — APPOINTMENT (OUTPATIENT)
Dept: CARDIOLOGY | Facility: CLINIC | Age: 72
End: 2022-11-03

## 2022-11-03 ENCOUNTER — NON-APPOINTMENT (OUTPATIENT)
Age: 72
End: 2022-11-03

## 2022-11-03 VITALS
WEIGHT: 168 LBS | TEMPERATURE: 97.8 F | SYSTOLIC BLOOD PRESSURE: 130 MMHG | BODY MASS INDEX: 25.46 KG/M2 | HEART RATE: 89 BPM | OXYGEN SATURATION: 97 % | DIASTOLIC BLOOD PRESSURE: 70 MMHG | HEIGHT: 68 IN

## 2022-11-03 DIAGNOSIS — R94.31 ABNORMAL ELECTROCARDIOGRAM [ECG] [EKG]: ICD-10-CM

## 2022-11-03 PROCEDURE — 93000 ELECTROCARDIOGRAM COMPLETE: CPT

## 2022-11-03 PROCEDURE — 99214 OFFICE O/P EST MOD 30 MIN: CPT

## 2022-11-03 NOTE — DISCUSSION/SUMMARY
[Procedure Low Risk] : the procedure risk is low [Patient Low Risk] : the patient is a low surgical risk [Optimized for Surgery] : the patient is optimized for surgery [As per surgery] : as per surgery [Continue] : Continue medications as currently directed [FreeTextEntry1] : There are no cardiac contraindications to the upcoming procedure. Na will follow up in this office as needed.

## 2022-11-03 NOTE — HISTORY OF PRESENT ILLNESS
[Preoperative Visit] : for a medical evaluation prior to surgery [Scheduled Procedure ___] : a [unfilled] [Date of Surgery ___] : on [unfilled] [Surgeon Name ___] : surgeon: [unfilled] [Good] : Good [Prior Anesthesia] : Prior anesthesia [Electrocardiogram] : ~T an ECG ~C was performed [Fever] : no fever [Chills] : no chills [Fatigue] : no fatigue [Chest Pain] : no chest pain [Cough] : no cough [Dyspnea] : no dyspnea [Dysuria] : no dysuria [Urinary Frequency] : no urinary frequency [Nausea] : no nausea [Vomiting] : no vomiting [Diarrhea] : no diarrhea [Abdominal Pain] : no abdominal pain [Easy Bruising] : no easy bruising [Lower Extremity Swelling] : no lower extremity swelling [Poor Exercise Tolerance] : no poor exercise tolerance [Diabetes] : no diabetes [Cardiovascular Disease] : no cardiovascular disease [Pulmonary Disease] : no pulmonary disease [Anti-Platelet Agents] : no anti-platelet agents [Nicotine Dependence] : no nicotine dependence [Alcohol Use] : no  alcohol use [Renal Disease] : no renal disease [GI Disease] : no gastrointestinal disease [Sleep Apnea] : no sleep apnea [Thromboembolic Problems] : no thromboembolic problems [Frequent use of NSAIDs] : no use of NSAIDs [Transfusion Reaction] : no transfusion reaction [Impaired Immunity] : no impaired immunity [Steroid Use in Last 6 Months] : no steroid use in the last six months [Frequent Aspirin Use] : no frequent aspirin use [Prev Anesthesia Reaction] : no previous anesthesia reaction [Anesthesia Reaction] : no anesthesia reaction [Sudden Death] : no sudden death [Clotting Disorder] : no clotting disorder [Bleeding Disorder] : no bleeding disorder [de-identified] : [facility] fax # [phone] [FreeTextEntry1] : Mrs. Brewster is a 72 yo female followed for h/o obesity, hyperlipidemia and migraine headaches. \par Patient had cardiac evaluation done in the past because her sister has Brugada syndrome. Had c/o heart fluttering and had ILR placed by Dr. Nolan at [facility] in 5/21. \par Resolved thoracic spine pain.\par Charlie need surgery for bunionectomy/hammer toe revision.

## 2022-11-07 ENCOUNTER — RESULT REVIEW (OUTPATIENT)
Age: 72
End: 2022-11-07

## 2023-02-02 ENCOUNTER — OUTPATIENT (OUTPATIENT)
Dept: OUTPATIENT SERVICES | Facility: HOSPITAL | Age: 73
LOS: 1 days | End: 2023-02-02
Payer: MEDICARE

## 2023-02-02 VITALS
RESPIRATION RATE: 16 BRPM | WEIGHT: 171.96 LBS | TEMPERATURE: 99 F | SYSTOLIC BLOOD PRESSURE: 109 MMHG | HEIGHT: 67 IN | HEART RATE: 84 BPM | OXYGEN SATURATION: 100 % | DIASTOLIC BLOOD PRESSURE: 74 MMHG

## 2023-02-02 DIAGNOSIS — M54.9 DORSALGIA, UNSPECIFIED: Chronic | ICD-10-CM

## 2023-02-02 DIAGNOSIS — Z98.890 OTHER SPECIFIED POSTPROCEDURAL STATES: Chronic | ICD-10-CM

## 2023-02-02 DIAGNOSIS — M24.574 CONTRACTURE, RIGHT FOOT: ICD-10-CM

## 2023-02-02 DIAGNOSIS — Z90.710 ACQUIRED ABSENCE OF BOTH CERVIX AND UTERUS: Chronic | ICD-10-CM

## 2023-02-02 DIAGNOSIS — M20.11 HALLUX VALGUS (ACQUIRED), RIGHT FOOT: ICD-10-CM

## 2023-02-02 DIAGNOSIS — M20.41 OTHER HAMMER TOE(S) (ACQUIRED), RIGHT FOOT: ICD-10-CM

## 2023-02-02 DIAGNOSIS — Z90.49 ACQUIRED ABSENCE OF OTHER SPECIFIED PARTS OF DIGESTIVE TRACT: Chronic | ICD-10-CM

## 2023-02-02 DIAGNOSIS — M20.5X1 OTHER DEFORMITIES OF TOE(S) (ACQUIRED), RIGHT FOOT: ICD-10-CM

## 2023-02-02 DIAGNOSIS — E03.9 HYPOTHYROIDISM, UNSPECIFIED: ICD-10-CM

## 2023-02-02 DIAGNOSIS — G43.909 MIGRAINE, UNSPECIFIED, NOT INTRACTABLE, WITHOUT STATUS MIGRAINOSUS: ICD-10-CM

## 2023-02-02 DIAGNOSIS — Z01.818 ENCOUNTER FOR OTHER PREPROCEDURAL EXAMINATION: ICD-10-CM

## 2023-02-02 LAB
ANION GAP SERPL CALC-SCNC: 7 MMOL/L — SIGNIFICANT CHANGE UP (ref 5–17)
BUN SERPL-MCNC: 22 MG/DL — SIGNIFICANT CHANGE UP (ref 7–23)
CALCIUM SERPL-MCNC: 9.2 MG/DL — SIGNIFICANT CHANGE UP (ref 8.4–10.5)
CHLORIDE SERPL-SCNC: 107 MMOL/L — SIGNIFICANT CHANGE UP (ref 96–108)
CO2 SERPL-SCNC: 29 MMOL/L — SIGNIFICANT CHANGE UP (ref 22–31)
CREAT SERPL-MCNC: 0.79 MG/DL — SIGNIFICANT CHANGE UP (ref 0.5–1.3)
EGFR: 81 ML/MIN/1.73M2 — SIGNIFICANT CHANGE UP
GLUCOSE SERPL-MCNC: 93 MG/DL — SIGNIFICANT CHANGE UP (ref 70–99)
HCT VFR BLD CALC: 41.1 % — SIGNIFICANT CHANGE UP (ref 34.5–45)
HGB BLD-MCNC: 13.3 G/DL — SIGNIFICANT CHANGE UP (ref 11.5–15.5)
MCHC RBC-ENTMCNC: 30.9 PG — SIGNIFICANT CHANGE UP (ref 27–34)
MCHC RBC-ENTMCNC: 32.4 GM/DL — SIGNIFICANT CHANGE UP (ref 32–36)
MCV RBC AUTO: 95.4 FL — SIGNIFICANT CHANGE UP (ref 80–100)
NRBC # BLD: 0 /100 WBCS — SIGNIFICANT CHANGE UP (ref 0–0)
PLATELET # BLD AUTO: 217 K/UL — SIGNIFICANT CHANGE UP (ref 150–400)
POTASSIUM SERPL-MCNC: 4.4 MMOL/L — SIGNIFICANT CHANGE UP (ref 3.5–5.3)
POTASSIUM SERPL-SCNC: 4.4 MMOL/L — SIGNIFICANT CHANGE UP (ref 3.5–5.3)
RBC # BLD: 4.31 M/UL — SIGNIFICANT CHANGE UP (ref 3.8–5.2)
RBC # FLD: 13.2 % — SIGNIFICANT CHANGE UP (ref 10.3–14.5)
SODIUM SERPL-SCNC: 143 MMOL/L — SIGNIFICANT CHANGE UP (ref 135–145)
WBC # BLD: 5.1 K/UL — SIGNIFICANT CHANGE UP (ref 3.8–10.5)
WBC # FLD AUTO: 5.1 K/UL — SIGNIFICANT CHANGE UP (ref 3.8–10.5)

## 2023-02-02 PROCEDURE — 85027 COMPLETE CBC AUTOMATED: CPT

## 2023-02-02 PROCEDURE — 36415 COLL VENOUS BLD VENIPUNCTURE: CPT

## 2023-02-02 PROCEDURE — 80048 BASIC METABOLIC PNL TOTAL CA: CPT

## 2023-02-02 PROCEDURE — G0463: CPT

## 2023-02-02 NOTE — H&P PST ADULT - NSICDXPASTMEDICALHX_GEN_ALL_CORE_FT
PAST MEDICAL HISTORY:  Bunion, left foot     Bunion, right     Hammer toe of left foot     Hammer toe of right foot     Hypothyroidism     Migraine     Osteoarthritis

## 2023-02-02 NOTE — H&P PST ADULT - PROBLEM SELECTOR PLAN 1
Pre-op instructions given. Pt verbalized understanding  Chlorhexidine wash instructions given  Pt instructed to HOLD all NSAID, Herbal tea/supplements, 7 days before surgery  Pending: M/C  for abnormal ecg  Pending: Covid pcr test/results

## 2023-02-02 NOTE — H&P PST ADULT - HISTORY OF PRESENT ILLNESS
73y/o female with medical h/o Hypothyroid, Migraine HA, Osteoarthritis on Cymbalta, and Loop recorder implanted 5/2021 for pmhx palpitations - pt denies any recent palpitation, CP or SOB. Pt  reports Bunion and Hammertoes, right foot and presents today for PST scheduled Correction Hallux Valgus + Correction Hammertoes, Right 2/10/23

## 2023-02-02 NOTE — H&P PST ADULT - NSCAFFEINETYPE_GEN_ALL_CORE_SD
"[facility] PHYSICAL THERAPY  [address]  [facility] [address]  [phone]    2018    Re: Vivienne Ruff   :   1995  MRN:  [id number]   REFERRING PHYSICIAN:   Yuliana Valles    [facility] PHYSICAL THERAP[facility]    Date of Initial Evaluation:  2018  Visits:  8  Rxs Used: 8  Reason for Referral:     Pelvic floor dysfunction  Obstructive defecation    PROGRESS  REPORT    Progress reporting period is from 18 to 18.       SUBJECTIVE  Subjective changes noted by patient:  Patient reports she is waiting to hear back to discuss surgery.  She feels she is working hard on continued stretching.  She continues to have difficulty with relaxation as she is fearful of causing another prolapse.  Per discussion we discussed that I agrees with her in the fact that her muscle tone has improved but continues to stay tight due to the prolapse.      Current pain level is 3/10.     Previous pain level was  8/10.   Changes in function:  Yes (See Goal flowsheet attached for changes in current functional level)  Adverse reaction to treatment or activity: None    OBJECTIVE  Changes noted in objective findings:  Yes,     Palpation: Minimal abdominal tone present Moderate tone through OI/LA musculature     SEMG Biofeedback:    Equipment:  MR-25  Surface electrode placement--Perianal:  X  Baseline EMG PM:   4.91uV    Peak pelvic muscle contraction:    2\" hold average 8.90uV/+2.74W-R      10\" hold 8.08uV/+5.12W-R     EMG interpretation to fatigue:  5-8 seconds  ASSESSMENT/PLAN  Updated problem list and treatment plan: Diagnosis 1:  Pelvic dysfunction, Constipation  Pain -  manual therapy, self management and education  Re: Vivienne Ruff   :   1995    Decreased ROM/flexibility - manual therapy and therapeutic exercise  Impaired muscle performance - biofeedback and neuro re-education  Decreased function - therapeutic activities  STG/LTGs have been met " or progress has been made towards goals:  Yes (See Goal flow sheet completed today.)  Assessment of Progress: The patient's condition has potential to improve.  Self Management Plans:  Patient has been instructed in a home treatment program.  Patient  has been instructed in self management of symptoms.  I have re-evaluated this patient and find that the nature, scope, duration and intensity of the therapy is appropriate for the medical condition of the patient.  Vivienne continues to require the following intervention to meet STG and LTG's:  PT    Recommendations:  This patient would benefit from continued therapy.     Frequency:  2 X a month, once daily  Duration:  for 3 months            Thank you for your referral.    INQUIRIES  Therapist: Kylie Linares, PT  [facility] PHYSICAL THERAP[facility]  [address]  [facility] [address]  Phone: [phone]  Fax: [phone]      coffee

## 2023-02-02 NOTE — H&P PST ADULT - NSICDXPASTSURGICALHX_GEN_ALL_CORE_FT
PAST SURGICAL HISTORY:  Back pain with history of spinal surgery 1999, 2008 with hardware    H/O hammer toe correction     H/O hernia repair incisional 1999    History of bunionectomy of left great toe     History of cholecystectomy 1995    History of D&C 1982 to 1985 (9 in total)    History of loop recorder 5/2021 h/o palpitation [employer]    S/P hysterectomy 1985

## 2023-02-02 NOTE — H&P PST ADULT - ALLERGIC/IMMUNOLOGIC
"-- Message is from the [facility]--    Reason for Call:   Knee pain, post-surgery 5/20  States tylenol with codeine not helping  Declines appointment, states no transportation  Please call patient      Alternative phone number: [phone]    Turnaround time given to caller: ""This message will be sent to [facility] Provider's name]. The clinical team will fulfill your request as soon as they review your message. \""    " details…

## 2023-02-09 ENCOUNTER — TRANSCRIPTION ENCOUNTER (OUTPATIENT)
Age: 73
End: 2023-02-09

## 2023-02-10 ENCOUNTER — RESULT REVIEW (OUTPATIENT)
Age: 73
End: 2023-02-10

## 2023-02-10 ENCOUNTER — OUTPATIENT (OUTPATIENT)
Dept: OUTPATIENT SERVICES | Facility: HOSPITAL | Age: 73
LOS: 1 days | End: 2023-02-10
Payer: MEDICARE

## 2023-02-10 ENCOUNTER — TRANSCRIPTION ENCOUNTER (OUTPATIENT)
Age: 73
End: 2023-02-10

## 2023-02-10 VITALS
SYSTOLIC BLOOD PRESSURE: 98 MMHG | DIASTOLIC BLOOD PRESSURE: 63 MMHG | TEMPERATURE: 99 F | HEART RATE: 85 BPM | RESPIRATION RATE: 19 BRPM | WEIGHT: 171.96 LBS | OXYGEN SATURATION: 98 % | HEIGHT: 67 IN

## 2023-02-10 VITALS
DIASTOLIC BLOOD PRESSURE: 60 MMHG | RESPIRATION RATE: 18 BRPM | TEMPERATURE: 97 F | HEART RATE: 81 BPM | SYSTOLIC BLOOD PRESSURE: 94 MMHG | OXYGEN SATURATION: 95 %

## 2023-02-10 DIAGNOSIS — Z98.890 OTHER SPECIFIED POSTPROCEDURAL STATES: Chronic | ICD-10-CM

## 2023-02-10 DIAGNOSIS — Z90.710 ACQUIRED ABSENCE OF BOTH CERVIX AND UTERUS: Chronic | ICD-10-CM

## 2023-02-10 DIAGNOSIS — M54.9 DORSALGIA, UNSPECIFIED: Chronic | ICD-10-CM

## 2023-02-10 DIAGNOSIS — M24.574 CONTRACTURE, RIGHT FOOT: ICD-10-CM

## 2023-02-10 DIAGNOSIS — M20.41 OTHER HAMMER TOE(S) (ACQUIRED), RIGHT FOOT: ICD-10-CM

## 2023-02-10 DIAGNOSIS — M20.11 HALLUX VALGUS (ACQUIRED), RIGHT FOOT: ICD-10-CM

## 2023-02-10 DIAGNOSIS — M20.5X1 OTHER DEFORMITIES OF TOE(S) (ACQUIRED), RIGHT FOOT: ICD-10-CM

## 2023-02-10 DIAGNOSIS — Z90.49 ACQUIRED ABSENCE OF OTHER SPECIFIED PARTS OF DIGESTIVE TRACT: Chronic | ICD-10-CM

## 2023-02-10 PROCEDURE — 28232 INCISION OF TOE TENDON: CPT | Mod: T8

## 2023-02-10 PROCEDURE — C1713: CPT

## 2023-02-10 PROCEDURE — 88304 TISSUE EXAM BY PATHOLOGIST: CPT | Mod: 26

## 2023-02-10 PROCEDURE — 73620 X-RAY EXAM OF FOOT: CPT

## 2023-02-10 PROCEDURE — 28299 COR HLX VLGS DOUBLE OSTEOT: CPT | Mod: RT

## 2023-02-10 PROCEDURE — 73620 X-RAY EXAM OF FOOT: CPT | Mod: 26,RT

## 2023-02-10 PROCEDURE — 88304 TISSUE EXAM BY PATHOLOGIST: CPT

## 2023-02-10 PROCEDURE — 88311 DECALCIFY TISSUE: CPT

## 2023-02-10 PROCEDURE — 88311 DECALCIFY TISSUE: CPT | Mod: 26

## 2023-02-10 PROCEDURE — 28285 REPAIR OF HAMMERTOE: CPT | Mod: T6

## 2023-02-10 PROCEDURE — C1889: CPT

## 2023-02-10 DEVICE — SCREW CORTEX S-T 2.7X18MM: Type: IMPLANTABLE DEVICE | Site: RIGHT | Status: FUNCTIONAL

## 2023-02-10 DEVICE — WIRE K THRD TRC 0.045X6IN NS: Type: IMPLANTABLE DEVICE | Site: RIGHT | Status: FUNCTIONAL

## 2023-02-10 DEVICE — SCREW CORTEX S-T 2.7X16MM: Type: IMPLANTABLE DEVICE | Site: RIGHT | Status: FUNCTIONAL

## 2023-02-10 RX ORDER — SODIUM CHLORIDE 9 MG/ML
1000 INJECTION, SOLUTION INTRAVENOUS
Refills: 0 | Status: DISCONTINUED | OUTPATIENT
Start: 2023-02-10 | End: 2023-02-10

## 2023-02-10 RX ORDER — VITAMIN E 100 UNIT
1 CAPSULE ORAL
Qty: 0 | Refills: 0 | DISCHARGE

## 2023-02-10 RX ORDER — TOPIRAMATE 25 MG
1 TABLET ORAL
Qty: 0 | Refills: 0 | DISCHARGE

## 2023-02-10 RX ORDER — PREGABALIN 225 MG/1
1 CAPSULE ORAL
Qty: 0 | Refills: 0 | DISCHARGE

## 2023-02-10 RX ORDER — HYDROMORPHONE HYDROCHLORIDE 2 MG/ML
0.5 INJECTION INTRAMUSCULAR; INTRAVENOUS; SUBCUTANEOUS
Refills: 0 | Status: DISCONTINUED | OUTPATIENT
Start: 2023-02-10 | End: 2023-02-10

## 2023-02-10 RX ORDER — SUMATRIPTAN SUCCINATE 4 MG/.5ML
1 INJECTION, SOLUTION SUBCUTANEOUS
Qty: 0 | Refills: 0 | DISCHARGE

## 2023-02-10 RX ORDER — LEVOTHYROXINE SODIUM 125 MCG
1 TABLET ORAL
Qty: 0 | Refills: 0 | DISCHARGE

## 2023-02-10 RX ORDER — ONDANSETRON 8 MG/1
4 TABLET, FILM COATED ORAL ONCE
Refills: 0 | Status: DISCONTINUED | OUTPATIENT
Start: 2023-02-10 | End: 2023-02-10

## 2023-02-10 RX ORDER — ASCORBIC ACID 60 MG
1 TABLET,CHEWABLE ORAL
Qty: 0 | Refills: 0 | DISCHARGE

## 2023-02-10 RX ORDER — DULOXETINE HYDROCHLORIDE 30 MG/1
1 CAPSULE, DELAYED RELEASE ORAL
Qty: 0 | Refills: 0 | DISCHARGE

## 2023-02-10 RX ORDER — HYDROMORPHONE HYDROCHLORIDE 2 MG/ML
0.25 INJECTION INTRAMUSCULAR; INTRAVENOUS; SUBCUTANEOUS
Refills: 0 | Status: DISCONTINUED | OUTPATIENT
Start: 2023-02-10 | End: 2023-02-10

## 2023-02-10 RX ADMIN — SODIUM CHLORIDE 50 MILLILITER(S): 9 INJECTION, SOLUTION INTRAVENOUS at 08:23

## 2023-02-10 NOTE — BRIEF OPERATIVE NOTE - NSICDXBRIEFPOSTOP_GEN_ALL_CORE_FT
POST-OP DIAGNOSIS:  Bunion, right foot 10-Feb-2023 10:52:22  Kristy Meredith of right foot 10-Feb-2023 10:52:33 Kim 2,3,4,5 Kristy Merdeith

## 2023-02-10 NOTE — BRIEF OPERATIVE NOTE - OPERATION/FINDINGS
s/p Right foot Naseem Wooten, modified malik bunionectomy, 2nd digit PIPJ arthoplasty, 2nd MPJ release, tenotomy and capsulotomy 3,4,5

## 2023-02-10 NOTE — PRE-ANESTHESIA EVALUATION ADULT - NSRADCARDRESULTSFT_GEN_ALL_CORE
Reason for Admission:  Sepsis                     RUR Score:  12%                   Plan for utilizing home health:  Patient declined        PCP: First and Last name:  Linda Juares MD     Name of Practice:    Are you a current patient: Yes/No:    Approximate date of last visit:    Can you participate in a virtual visit with your PCP:                     Current Advanced Directive/Advance Care Plan: Full Code      Healthcare Decision Maker:   Click here to complete [address] including selection of the Healthcare Decision Maker Relationship (ie \"Primary\")             Primary Decision Maker: Janis Chiang - Mother - [phone]                  Transition of Care Plan:                    CM discussed discharge planning with patient at bedside. Patient will return home self care. Patient lives in a two story home with her son. Her living area is on the first floor. There are three steps to enter home. Patient is independent with ADL/IADL care. Patient self medicate and drive to appt. Son will transport at discharge.  Pharmacy: [address] [phone] EKG: NSR @ 92. No acute ST/T changes.

## 2023-02-10 NOTE — ASU PATIENT PROFILE, ADULT - NS SC CAGE ALCOHOL EYE OPENER
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 3) (C77 3)    Plan  Malignant neoplasm of central portion of right female breast    · We recommend that you examine your own breasts for lumps and other changes ;  Status:Complete;   Done: [id number]   Ordered; For:Malignant neoplasm of central portion of right female breast; Ordered By:Proothi, Jose;   · (1) CA 27 29; Status:Active; Requested for:07Mar2017;    Perform:[facility]; [id number]; Ordered; For:Malignant neoplasm of central portion of right female breast; Ordered By:Proothi, Jose;   · (1) CA 27 29; Status: In Progress - Order Generated; Requested for:Recurring Schedule:  3/7/2017; 4/4/2017; 5/2/2017; 5/30/2017; 6/27/2017; 7/25/2017 ;    Perform:[facility]; [id number]; Ordered; For:Malignant neoplasm of central portion of right female breast; Ordered By:Proothi, Jose;   · (1) CBC/PLT/DIFF; Status:Active; Requested for:07Mar2017;    Perform:[facility]; [id number]; Ordered; For:Malignant neoplasm of central portion of right female breast; Ordered By:Proothi, Ojse;   · (1) CBC/PLT/DIFF; Status: In Progress - Order Generated; Requested for:Recurring  Schedule: 3/7/2017; 4/4/2017; 5/2/2017; 5/30/2017; 6/27/2017; 7/25/2017 ;    Perform:[facility]; [id number]; Ordered; For:Malignant neoplasm of central portion of right female breast; Ordered By:Proothi, Jose;   · (1) COMPREHENSIVE METABOLIC PANEL; Status:Active; Requested for:07Mar2017;    Perform:[facility]; [id number]; Ordered; For:Malignant neoplasm of central portion of right female breast; Ordered By:Jose Dumont;   · (1) COMPREHENSIVE METABOLIC PANEL; Status: In Progress - Order Generated;   Requested for:Recurring Schedule: 3/7/2017; 4/4/2017; 5/2/2017; 5/30/2017; 6/27/2017;  7/25/2017 ;    Perform:[facility]; [id number]:[id number]; Ordered; For:Malignant neoplasm of central portion of right female breast; Ordered By:Jose Dumont;   · Follow-up visit in 2 months Evaluation and Treatment  Follow-up  Status: Complete   Done: [id number] 02:15PM   Ordered; For: Malignant neoplasm of central portion of right female breast; Ordered By: Kenton Lora Performed:  Due: [id number]; Last Updated By: Kimball Gaucher; 3/8/2017 2:54:32 PM    Discussion/Summary  Discussion Summary:   Patient is here with her daughter  Since April 2016 patient has been on Femara and Xgeva for metastatic, receptor positive right breast cancer to right axillary lymph node, lungs and bones  She has responded  Her performance status improved  Tumor marker CA 27 29 came down but has gone up again this time  PET/CT scan has shown improvement in the right breast and axilla are mass and lung nodules but increased disease in the bones  Patient does not have increased bone pains  If disease continued to progress she could be considered for Faslodex plus ibrance plus Xgeva  She did not want ibrance the first time with Femara  She did not like   the side effects of ibrance especially drop in blood counts and infection/pneumonia risks  Patient's breast cancer was diagnosed in March 2016, hormone receptors positive and HER-2 negative  , de kapil stage IV  Tien Corey Physical examination and test results are as recorded and discussed  Plan is as above, to continue on Femara and Xgeva and to monitor disease progression and may switch to Faslodex plus ibrance plus Xgeva  Condition discussed and explained  Questions answered  Goals and Barriers: The patient has the current Goals: Progression of survival  The patent has the current Barriers: None  Patient's Capacity to Self-Care: Patient is able to Self-Care     Patient Education: Educational resources provided: Discussed increasing tumor marker and ordered imaging studies for reevaluation  Discussed what could come next after Femara  Counseling Documentation With Imm: The patient, patient's family was counseled regarding diagnostic results, patient and family education, impressions  total time of encounter was 35 minutes and 20 minutes was spent counseling  Understands and agrees with treatment plan: The treatment plan was reviewed with the patient/guardian  The patient/guardian understands and agrees with the treatment plan      Chief Complaint  Chief Complaint: Chief Complaint:   The patient presents to the office today with Breast cancer with metastasis  History of Present Illness  HPI: Patient is here with her daughter  Since April 2016 patient has been on Femara and Xgeva for metastatic, receptor positive right breast cancer to right axillary lymph node, lungs and bones  She has responded  Her performance status improved  Tumor marker CA 27 29 came down but has gone up again this time  PET/CT scan has shown improvement in the right breast and axilla are mass and lung nodules but increased disease in the bones  Patient does not have increased bone pains  If disease continued to progress she could be considered for Faslodex plus ibrance plus Xgeva  She did not want ibrance the first time with Femara  She did not like   the side effects of ibrance especially drop in blood counts and infection/pneumonia risks  Patient's breast cancer was diagnosed in March 2016, hormone receptors positive and HER-2 negative  , de kapil stage IV  Tien Nicole Review of Systems               No headaches, seizures, diplopia, dysphagia, hoarseness, angina pain, chest pain, palpitations, shortness of breath, cough, hemoptysis, abdominal pain, melena, or hematuria  No fever, chills, bleeding, bone pains, skin rash,  nausea, vomiting and no change in bowel habits  Appetite is fair  No night sweats  Patient has  arthritic symptoms  No leg cramps    No swelling of the ankles  No swollen glands  Anxious  Not unusually sensitive to heat or cold  No recent or frequent infections  No GYN symptoms  Reviewed 14 systems  Other symptoms as in history of present illness  ROS Reviewed:   ROS reviewed  Active Problems    1  Benign essential hypertension (401 1) (I10)   2  Bone metastases (198 5) (C79 51)   3  Cancer, metastatic to lung (197 0) (C78 00)   4  Diabetes mellitus (250 00) (E11 9)   5  Malignant neoplasm metastatic to lymph node of axilla (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 3) (C77 3)   6  Malignant neoplasm of central portion of right female breast (174 1) (C50 111)    Past Medical History  Active Problems And Past Medical History Reviewed: The active problems and past medical history were reviewed and updated today  Insulin-dependent diabetes mellitus  Hypertension  Pneumonia  Surgical History  Surgical History Reviewed: The surgical history was reviewed and updated today  No significant past surgical history  Family History  Mother    1  Family history of malignant neoplasm of uterus (V16 49) (Z80 49)  Sister    2  Family history of malignant neoplasm of uterus (V16 49) (Z80 49)  Family History Reviewed: The family history was reviewed and updated today  Mother and sister had cancer of the uterus  History of diabetes and hypertension in the family  Social History    · Former smoker (R57 34) (U71 498)   · No alcohol use  Social History Reviewed: The social history was reviewed and updated today  No alcohol  She quit smoking cigarettes in 1977  She is at an assisted living facility  Current Meds   1  AmLODIPine Besylate 2 5 MG Oral Tablet; Therapy: [id number] to Recorded   2  Letrozole 2 5 MG Oral Tablet; 1 tab PO daily; Therapy: [id number] to (Last [facility])  Requested for: 21Jun2016 Ordered   3  NovoLOG 100 UNIT/ML Subcutaneous Solution; INJECT SUBCUTANEOUSLY AS   DIRECTED; Therapy: (Recorded:07Mar2017) to Recorded    Allergies    1   No Known Drug Allergies      Reviewed     Vitals  Vital Signs    Recorded: 67VLT4025 02:52PM   Temperature 96 8 F   Heart Rate 114   Respiration 16   Systolic 602   Diastolic 80   Height 5 ft 1 in   Weight 182 lb 4 96 oz   BMI Calculated 34 45   BSA Calculated 1 81   O2 Saturation 98   Pain Scale 0                Reviewed  Heart rate came down to 106  Physical Exam                      Patient is alert and oriented and not in distress  Stable vital signs except for tachycardia  No icterus  No  oral thrush  No sores in the mouth  No palpable neck mass  Regular heart rate  Systolic murmur  Clear lungs  A  large mass in the upper outer quadrant of right breast and  in right axilla has decreased in size significantly     Abdomen soft and nontender  No palpable abdominal mass  No ascites  No edema of ankles  No calf tenderness  No focal neurological deficit  Generalized weakness  Palpable lymphadenopathy in the right axilla  Good arterial pulses  Anxious  No clubbing  She ambulates with a walker  No skin rash  Performance status 2  ECOG 2       Results/Data  * NM PET CT SKULL BASE TO MID THIGH [id number] 07:43AM Rell Dumont Order Number: [id number]    - Patient Instructions: To schedule this appointment, please contact Central Scheduling at [phone]  Test Name Result Flag Reference   NM PET CT SKULL BASE TO MID THIGH (Report)     PET/CT SCAN     INDICATION: Metastatic breast carcinoma  Elevated tumor markers  MODIFIER:   PS      COMPARISON: No prior PET scans  On scan dated 3/14/2016 and body CT 3/5/2016     CELL TYPE: Invasive breast carcinoma of no special type with focal mucin secretion  Status post right breast biopsy 3/15/2016     TECHNIQUE:  8 4 mCi F-18-FDG administered IV   Multiplanar attenuation corrected and non attenuation corrected PET images are available for interpretation, and contiguous, low dose, axial CT sections were obtained from the skull base through the femurs following the administration of oral contrast material (7 5 cc Omnipaque-240 in 300 cc water)  Intravenous contrast material was not utilized  Fasting serum glucose: 69 mg/dl     FINDINGS:      VISUALIZED BRAIN:     No acute abnormalities are seen  HEAD/NECK:     No glucose avid soft tissue lesions or hypermetabolic adenopathy detected  CHEST:     Prior CT study demonstrated widespread pulmonary nodules most compatible with metastatic disease  Small nodules within the left upper lobe are again visualized on today's study image 73  There are 2 nodules noted on this image measuring 4 mm and 2 mm  The lesions have minimally diminished in size compared to the prior  A previous subpleural left upper lobe lesion on image 14 is seen on image 75 today and appears more linear rather than nodular  Similarly previously seen lingular lesions, and    lateral right lung field lesions have diminished in conspicuity and size  As an example, lateral right middle lobe lesion earlier measuring 4 mm now 2 mm, image 99  The residual small lung nodules demonstrate no significant glucose metabolism  Hypermetabolic right breast mass identified measuring 2 8 x 2 2 cm in size, SUV max 2 1  On prior CT measuring 4 8 x 2 8 cm  Right axillary lobular mass currently measures 2 6 x 1 8 cm, SUV max 2 4  Prior size 2 9 x 2 2 cm  There is no pleural or pericardial effusion  Coronary artery calcifications are present  There is a small hiatal hernia  ABDOMEN:     No FDG avid soft tissue lesions are seen  CT images: No hydronephrosis or bowel obstruction, no ascites  PELVIS:    No FDG avid soft tissue lesions are seen  CT images: Sigmoid diverticulosis present  OSSEOUS STRUCTURES:   As noted on prior bone scan or body CT, extensive osseous metastases are seen  Multiple lesions are demonstrated on CT, many of the lesions demonstrating significant hypermetabolism   Although many more osseous lesions are seen on the PET scan than on    the prior bone scan this may be related to changes in imaging modality  The following represent examples  Right ischial tuberosity sclerotic lesion, SUV max 4 2  Greater sclerosis today than on prior CT suggesting at least some degree of healing    response  Hypermetabolism in the right femoral neck is also noted without obvious abnormality on CT  SUV max 4 2  A small region of cortical erosion is seen in the right iliac bone medially, without progression from prior  There is felt to be    increasing bony sclerosis in the iliac bones bilaterally  Significant hypermetabolism persists, for example, in the posterior left iliac bone on image 182, SUV max 6 0  Bilateral sacral wing disease is also noted as well as multiple lumbar lesions  There is diffuse involvement of the thoracic spine and ribs, with left rib fractures identified which may be pathologic in nature  Metastases involve the sternum as well as the manubrium  Additional lesions are noted in the cervical spine including the   left anterior arch of C1  Again seen is a compression deformity of T7  Interval further loss of height has occurred since the prior CT       IMPRESSION:       1  Partial improvement of previously seen pulmonary metastases  Multiple small nodules are again noted, overall there has been a partial diminishment in size without any areas of disease progression  2  The right breast mass and right axillary mass have both diminished in size  Both demonstrate mild hypermetabolism  3  No findings to suggest glucose avid soft tissue metastasis in the abdomen, pelvis, or neck  4  Widespread osseous metastases  Many more lesions detected on the PET scan than the prior bone scan  However, increasing sclerosis within the osseous lesions compared to prior CT favors at least some degree of healing response   Compression    deformity of the T7 vertebral body again noted with further anterior loss of height Workstation performed: [id number]     Signed by:   Eitan Dobson MD   2/1/17     (1) COMPREHENSIVE METABOLIC PANEL [id number] 27:40PF Proothi, [id number]     Test Name Result Flag Reference   GLUCOSE,RANDM 248 mg/dL H    If the patient is fasting, the ADA then defines impaired fasting glucose as > 100 mg/dL and diabetes as > or equal to 123 mg/dL  SODIUM 138 mmol/L  136-145   POTASSIUM 4 7 mmol/L  3 5-5 3   CHLORIDE 102 mmol/L  100-108   CARBON DIOXIDE 27 mmol/L  21-32   ANION GAP (CALC) 9 mmol/L  4-13   BLOOD UREA NITROGEN 31 mg/dL H 5-25   CREATININE 1 83 mg/dL H 0 60-1 30   Standardized to IDMS reference method   CALCIUM 9 6 mg/dL  8 3-10 1   BILI, TOTAL 0 26 mg/dL  0 20-1 00   ALK PHOSPHATAS 84 U/L     ALT (SGPT) 24 U/L  12-78   AST(SGOT) 24 U/L  5-45   ALBUMIN 3 3 g/dL L 3 5-5 0   TOTAL PROTEIN 7 3 g/dL  6 4-8 2   eGFR Non-African American 26 0 ml/min/1 73sq [facility] Disease Education Program recommendations are as follows:  GFR calculation is accurate only with a steady state creatinine  Chronic Kidney disease less than 60 ml/min/1 73 sq  meters  Kidney failure less than 15 ml/min/1 73 sq  meters  (1) CBC/PLT/DIFF [id number] 10:53AM Proothi, Alana Mins     Test Name Result Flag Reference   WBC COUNT 6 49 Thousand/uL  4 31-10 16   RBC COUNT 4 16 Million/uL  3 81-5 12   HEMOGLOBIN 12 4 g/dL  11 5-15 4   HEMATOCRIT 38 8 %  34 8-46  1   MCV 93 fL  82-98   MCH 29 8 pg  26 8-34 3   MCHC 32 0 g/dL  31 4-37 4   RDW 13 3 %  11 6-15 1   MPV 10 2 fL  8 9-12 7   PLATELET COUNT 298 Thousands/uL  149-390   nRBC AUTOMATED 0 /100 WBCs     NEUTROPHILS RELATIVE PERCENT 64 %  43-75   LYMPHOCYTES RELATIVE PERCENT 25 %  14-44   MONOCYTES RELATIVE PERCENT 10 %  4-12   EOSINOPHILS RELATIVE PERCENT 1 %  0-6   BASOPHILS RELATIVE PERCENT 0 %  0-1   NEUTROPHILS ABSOLUTE COUNT 4 12 Thousands/?L  1 85-7 62   LYMPHOCYTES ABSOLUTE COUNT 1 59 Thousands/?L  0 60-4 47   MONOCYTES ABSOLUTE COUNT 0 65 Thousand/?L  0 17-1 22 EOSINOPHILS ABSOLUTE COUNT 0 08 Thousand/?L  0 00-0 61   BASOPHILS ABSOLUTE COUNT 0 01 Thousands/?L  0 00-0 10     (1) CA 27 29 [id number] 10:53AM Jose Dumont     Test Name Result Flag Reference   CA 27 29(NEW) 532 1 U/mL H 0 0-42 3     Future Appointments    Date/Time Provider Specialty Site   05/02/2017 02:15 PM Alaina Dumont MD Hematology Oncology [facility]     Signatures   Electronically signed by : Eliazar Novak MD; Mar 13 2017 12:53AM EST                       (Author) no

## 2023-02-10 NOTE — ASU DISCHARGE PLAN (ADULT/PEDIATRIC) - NS DC INTERPRETER YES NO
History and Physical   Colon and Rectal Surgery   Zac Baltazar 50 y o  female MRN: [id number]  Unit/Bed#:  Encounter: [id number]  02/19/20   10:18 AM      CC:  Diarrhea  History of diverticular resection  History of Present Illness   HPI:  Zac Baltazar is a 50 y o  female with urgent bowel movements and diarrhea  This is worse after her sigmoidectomy but was present before surgery  She has no other GI problems or suggestion of malabsorption  Historical Information   Past Medical History:   Diagnosis Date    Acute hemorrhagic cystitis     last assessed 07/22/2013    Allergic     Anxiety     Cholelithiasis with acute cholecystitis     Colitis     Depression     Diverticulitis     Diverticulosis     Eczema     Frequent UTI     Limb swelling     last assessed 03/20/2013    Lung nodule     right    Menorrhagia     last assessed 01/07/2013    Pituitary tumor     Pneumonia 2004    x1     Stress incontinence     Wears contact lenses     Wears glasses      Past Surgical History:   Procedure Laterality Date    BRONCHOSCOPY N/A 9/13/2016    Procedure: BRONCHOSCOPY FLEXIBLE ( FROZEN SECTION) ; Surgeon: Elke Simpson MD;  [facility];  Service:     CHOLECYSTECTOMY      Laparoscopic    COLONOSCOPY      ESOPHAGOGASTRODUODENOSCOPY      ESSURE TUBAL LIGATION      LIPOSUCTION      TN BRONCHOSCOPY NEEDLE BX TRACHEA MAIN STEM&/BRON N/A 9/13/2016    Procedure: ENDOBRONCHIAL ULTRASOUND (EBUS);   Surgeon: Elke Simpson MD;  [facility];  Service: Thoracic    TN CYSTOURETHROSCOPY N/A 11/21/2016    Procedure: CYSTOSCOPY;  Surgeon: Rangel Martinez MD;  [facility];  Service: UroGynecology            [address] 3 1- 4 CM FACE,FACIAL Left 12/8/2016    Procedure: Huan Huitron SKIN LESION EXCISION/BIOPSY;  Surgeon: Davin Tavarez MD;  [facility];  Service: Plastics    TN LAP,SURG,COLECTOMY, PARTIAL, Lorraine Scripture N/A 6/22/2018    Procedure: ROBOTIC SIGMOID RESECTION;  Surgeon: Nai Keating MD;  [facility];  Service: Colorectal    MD RECMPL WND HEAD,FAC,HAND 2 6-7 5 CM Left 12/8/2016    Procedure: COMPLEX CLOSURE;  Surgeon: Timmy Hernandez MD;  [facility];  Service: Plastics    MD SIGMOIDOSCOPY FLX DX W/COLLJ SPEC BR/WA IF PFRMD N/A 6/22/2018    Procedure: [place];  Surgeon: Nai Keating MD;  [facility];  Service: Colorectal    MD SLING OPER STRES INCONTINENCE N/A 11/21/2016    Procedure: [place];  Surgeon: Robyn Rosario MD;  [facility];  Service: UroGynecology           SINUS SURGERY      WISDOM TOOTH EXTRACTION         Meds/Allergies       (Not in a hospital admission)      Current Outpatient Medications:     buPROPion (WELLBUTRIN XL) 150 mg 24 hr tablet, Take 3 tablets (450 mg total) by mouth daily, Disp: 270 tablet, Rfl: 3    levothyroxine 88 mcg tablet, Take 1 tablet (88 mcg total) by mouth daily, Disp: 30 tablet, Rfl: 11    mometasone (ELOCON) 0 1 % cream, Apply 1 application topically daily, Disp: 45 g, Rfl: 3    mometasone (ELOCON) 0 1 % lotion, APPLY TO AFFECTED AREA EVERY DAY, Disp: 60 mL, Rfl: 0    traZODone (DESYREL) 50 mg tablet, Take 2 tablets (100 mg total) by mouth daily at bedtime, Disp: 180 tablet, Rfl: 3    Allergies   Allergen Reactions    Bactrim [Sulfamethoxazole-Trimethoprim] Hives    Serotonin Reuptake Inhibitors (Ssris) Hives     Other reaction(s): DUE TO PITUITARY ADENOMA    Effexor had bad effects when being weaned off- has a pituitary  tumor         Social History   Social History     Substance and Sexual Activity   Alcohol Use Yes    Alcohol/week: 6 0 standard drinks    Types: 2 Glasses of wine, 2 Cans of beer, 2 Shots of liquor per week    Frequency: 2-3 times a week    Comment: weekends only      Social History     Substance and Sexual Activity   Drug Use No     Social History     Tobacco Use   Smoking Status Never Smoker   Smokeless Tobacco Never Used Family History:   Family History   Problem Relation Age of Onset    Depression Mother     No Known Problems Father     No Known Problems Son     No Known Problems Daughter     Alcohol abuse Family     Depression Family     Osteoporosis Family     No Known Problems Maternal Grandmother     No Known Problems Maternal Grandfather     No Known Problems Paternal Grandmother     No Known Problems Paternal Grandfather     Substance Abuse Neg Hx     Mental illness Neg Hx      Review of Systems - General ROS: negative  Respiratory ROS: no cough, shortness of breath, or wheezing  Cardiovascular ROS: no chest pain or dyspnea on exertion     Objective     Current Vitals:   Blood Pressure: 108/66 (02/19/20 0937)  Pulse: 73 (02/19/20 0937)  Temperature: 97 7 °F (36 5 °C) (02/19/20 0937)  Temp Source: Oral (02/19/20 0937)  Respirations: 17 (02/19/20 0937)  Height: 5' 2" (157 5 cm) (02/19/20 9808)  Weight - Scale: 64 9 kg (143 lb) (02/19/20 0937)  SpO2: 100 % (02/19/20 0937)  No intake or output data in the 24 hours ending 02/19/20 1018    Physical Exam:  General:  Well nourished, no distress  Neuro: Alert and oriented  Eyes:Sclera anicteric, conjunctiva pink  Pulm: Clear to auscultation bilaterally  No respiratory Distress  CV:  Regular rate and rhythm  No murmurs  Abdomen:  Soft, flat, non-tender, without masses or hepatosplenomegaly  Lab Results:       ASSESSMENT:  Rosie Thomas is a 50 y o  female for screening and colon biopsies  PLAN:  Colonoscopy  Risks , including, but not limited to, bleeding, perforation, missed lesions, and potential need for surgery, were reviewed  Alternatives to colonoscopy were discussed    Brigitte Holder MD No

## 2023-02-10 NOTE — ASU DISCHARGE PLAN (ADULT/PEDIATRIC) - NS MD DC FALL RISK RISK
For information on Fall & Injury Prevention, visit: [email or web address] OR  [email or web address] OR  https://www.cdc.gov/steadi/patient.html

## 2023-02-10 NOTE — BRIEF OPERATIVE NOTE - NSICDXBRIEFPROCEDURE_GEN_ALL_CORE_FT
PROCEDURES:  Je bunionectomy 10-Feb-2023 10:50:13 Right foot Je bunionectomy Kristy Meredith  Naseem osteotomy 10-Feb-2023 10:50:27 Right foot Akin bunionectomy Kristy Meredith  Arthroplasty, toe, PIP joint, for hammertoe 10-Feb-2023 10:50:47 2nd digit arthroplasty, right foot, 2nd MPJ release, Kristy Meredith  Tenotomy, flexor, for hammer toe 10-Feb-2023 10:51:32 Flexor tenotomy and capsulotomy 3,4,5 Kristy Meredith

## 2023-02-10 NOTE — BRIEF OPERATIVE NOTE - NSICDXBRIEFPREOP_GEN_ALL_CORE_FT
PRE-OP DIAGNOSIS:  Bunion, right foot 10-Feb-2023 10:51:53  Kristy Meredith of right foot 10-Feb-2023 10:52:03 Kim 2,3,4,5 Kristy Meredith

## 2023-02-10 NOTE — ASU DISCHARGE PLAN (ADULT/PEDIATRIC) - CARE PROVIDER_API CALL
Kristy Meredith (DPM)  Surgery  [facility] [place] [address]  Phone: [phone]  Fax: [phone]  Established Patient  Scheduled Appointment: 02/14/2023

## 2023-02-10 NOTE — PRE-ANESTHESIA EVALUATION ADULT - NSDENTALSD_ENT_ALL_CORE
Permanent fixed upper/lower caps/bridges. No loose teeth./appears normal and intact/caps/bridge/implants

## 2023-02-10 NOTE — ASU PATIENT PROFILE, ADULT - REASON FOR ADMISSION, PROFILE
'having my right foot bunion removed and hammer toe corrected and the three smaller toes cut to straighten'

## 2023-02-10 NOTE — ASU DISCHARGE PLAN (ADULT/PEDIATRIC) - NURSING INSTRUCTIONS
call for temp greater than 101F drink plenty fluids,  take medications as ordered,   keep surgical shoe on at all times,  keep operated foot elevated at all times when not walking or standing,  use ice packs per instructions

## 2023-02-10 NOTE — PRE-ANESTHESIA EVALUATION ADULT - NSANTHADDINFOFT_GEN_ALL_CORE
Discussed IVS with block by Podiaty/GA in detail with patient and all questions sought and answered. Pt. agrees to all plans and wishes to proceed with surgical care.    Medical evaluation/optimization and clearance noted and appreciated.

## 2023-02-15 LAB — SURGICAL PATHOLOGY STUDY: SIGNIFICANT CHANGE UP

## 2023-03-03 LAB — SARS-COV-2 N GENE NPH QL NAA+PROBE: NOT DETECTED

## 2023-04-14 ENCOUNTER — APPOINTMENT (OUTPATIENT)
Dept: CARDIOLOGY | Facility: CLINIC | Age: 73
End: 2023-04-14
Payer: MEDICARE

## 2023-04-14 VITALS
WEIGHT: 172 LBS | OXYGEN SATURATION: 98 % | DIASTOLIC BLOOD PRESSURE: 60 MMHG | BODY MASS INDEX: 26.07 KG/M2 | HEART RATE: 46 BPM | SYSTOLIC BLOOD PRESSURE: 130 MMHG | HEIGHT: 68 IN

## 2023-04-14 DIAGNOSIS — E03.9 HYPOTHYROIDISM, UNSPECIFIED: ICD-10-CM

## 2023-04-14 PROBLEM — M21.611 BUNION OF RIGHT FOOT: Chronic | Status: ACTIVE | Noted: 2023-02-02

## 2023-04-14 PROBLEM — M20.41 OTHER HAMMER TOE(S) (ACQUIRED), RIGHT FOOT: Chronic | Status: ACTIVE | Noted: 2023-02-02

## 2023-04-14 PROCEDURE — 99213 OFFICE O/P EST LOW 20 MIN: CPT

## 2023-04-14 PROCEDURE — 93000 ELECTROCARDIOGRAM COMPLETE: CPT

## 2023-04-14 RX ORDER — DULOXETINE HYDROCHLORIDE 20 MG/1
20 CAPSULE, DELAYED RELEASE ORAL TWICE DAILY
Qty: 60 | Refills: 0 | Status: DISCONTINUED | COMMUNITY
Start: 2022-09-02 | End: 2023-04-14

## 2023-04-14 RX ORDER — METHOTREXATE 2.5 MG/1
2.5 TABLET ORAL
Refills: 0 | Status: ACTIVE | COMMUNITY

## 2023-04-16 ENCOUNTER — NON-APPOINTMENT (OUTPATIENT)
Age: 73
End: 2023-04-16

## 2023-04-16 NOTE — CARDIOLOGY SUMMARY
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 \par  -RSR(V1) -nondiagnostic.  -Old anterior infarct. \par 9/2/22, Sinus Rhythm, -RSR(V1) -nondiagnostic. -Left axis -anterior fascicular block.\par 2/21/22, Sinus rhythm, Left axis/LAHB, RSR (V1)- non diagnostic, no interval changes.\par 1/25/22, Sinus Rhythm -Frequent pvcs -ventricular bigeminy, Low voltage in precordial leads. -RSR(V1) -nondiagnostic. -Left axis -anterior fascicular block.\par 7/20/21, Sinus  Rhythm  - frequent ectopic ventricular beats, -RSR(V1) -nondiagnostic.  -Left atrial enlargement.  [de-identified] : 6/16/21 - Medtronic LINQ placed [de-identified] : 4/7/22, Normal ex nuc stress test [___] : [unfilled] [LVEF ___%] : LVEF [unfilled]%

## 2023-04-16 NOTE — HISTORY OF PRESENT ILLNESS
[FreeTextEntry1] : Mrs. Brewster is a 71 yo female followed for h/o obesity, hyperlipidemia and migraine headaches. \par Patient had cardiac evaluation done in the past because her sister has Brugada syndrome. Had c/o heart fluttering and had ILR placed by Dr. Nolan at [facility] in 5/21. \par Resolved thoracic spine pain.\par \par \par \par  \par

## 2023-04-16 NOTE — DISCUSSION/SUMMARY
[___ Month(s)] : in [unfilled] month(s) [FreeTextEntry1] : 72 year-old female with frequent ectopy on her 12 lead ECG - ILR monitored at [facility], will request copy of ILR interrogations.\par Labs requested.\par gained some weight, somewhat less active, will increase physical activity.\par \par  [EKG obtained to assist in diagnosis and management of assessed problem(s)] : EKG obtained to assist in diagnosis and management of assessed problem(s)

## 2023-11-02 NOTE — ASU PATIENT PROFILE, ADULT - WILL THE PATIENT ACCEPT THE PFIZER COVID-19 VACCINE IF ELIGIBLE AND IT IS AVAILABLE?
Pt has met discharge criteria of PACU per Dustin score, pt is A&O x 3, V/SS, pt is A& x 3. Pt denies pain or discomfort. Report was given to nurse Tameka RN. Pt was sent back to 3S via bed by the transporter.    Not applicable

## 2023-12-15 ENCOUNTER — APPOINTMENT (OUTPATIENT)
Dept: CARDIOLOGY | Facility: CLINIC | Age: 73
End: 2023-12-15
Payer: MEDICARE

## 2023-12-15 ENCOUNTER — NON-APPOINTMENT (OUTPATIENT)
Age: 73
End: 2023-12-15

## 2023-12-15 VITALS
OXYGEN SATURATION: 98 % | WEIGHT: 183 LBS | HEIGHT: 68 IN | BODY MASS INDEX: 27.74 KG/M2 | SYSTOLIC BLOOD PRESSURE: 124 MMHG | HEART RATE: 81 BPM | DIASTOLIC BLOOD PRESSURE: 76 MMHG

## 2023-12-15 PROCEDURE — 99213 OFFICE O/P EST LOW 20 MIN: CPT

## 2023-12-15 PROCEDURE — 93000 ELECTROCARDIOGRAM COMPLETE: CPT

## 2023-12-15 RX ORDER — FOLIC ACID 1 MG/1
1 TABLET ORAL
Refills: 0 | Status: DISCONTINUED | COMMUNITY
End: 2023-12-15

## 2023-12-15 NOTE — HISTORY OF PRESENT ILLNESS
[FreeTextEntry1] : 74 yo female followed for h/o obesity, hyperlipidemia and migraine headaches.  Patient had cardiac evaluation done in the past because her sister has Brugada syndrome. Had c/o heart fluttering and had ILR placed by Dr. Nolan at [facility] in 5/21. Planned explantation in January 2024.  Continued lumbar and thoracic spine pain. Getting PT. Alos began to c/o right shoulder pain and motion limitation, was seen by Ortho and Neurology. While conducting An NCV test, she apparently syncopized, Neurologist suspected some facial asymmetry and she was sent to [facility] for evaluation. No evidence of CVA as per patient, will request records.  Feeling well otherwise no other constitutional symptoms.

## 2023-12-15 NOTE — DISCUSSION/SUMMARY
[___ Month(s)] : in [unfilled] month(s) [FreeTextEntry1] : 73 year-old female with h/ o ectopy on her 12 lead ECG - ILR monitored at [facility], no reported events Labs requested. She gained some weight, somewhat less active because of back pain, will increase physical activity. Continue current Rx.   [EKG obtained to assist in diagnosis and management of assessed problem(s)] : EKG obtained to assist in diagnosis and management of assessed problem(s)

## 2023-12-15 NOTE — CARDIOLOGY SUMMARY
[de-identified] : 12/15/23, Sinus Rhythm , -RSR(V1) -possible incomplete right bundle branch block and anterior fascicular block. 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   -RSR(V1) -nondiagnostic.  -Old anterior infarct.  9/2/22, Sinus Rhythm, -RSR(V1) -nondiagnostic. -Left axis -anterior fascicular block. 2/21/22, Sinus rhythm, Left axis/LAHB, RSR (V1)- non diagnostic, no interval changes. 1/25/22, Sinus Rhythm -Frequent pvcs -ventricular bigeminy, Low voltage in precordial leads. -RSR(V1) -nondiagnostic. -Left axis -anterior fascicular block. 7/20/21, Sinus  Rhythm  - frequent ectopic ventricular beats, -RSR(V1) -nondiagnostic.  -Left atrial enlargement.  [de-identified] : 6/16/21 - Medtronic LINQ placed [de-identified] : 4/7/22, Normal ex nuc stress test [___] : [unfilled] [LVEF ___%] : LVEF [unfilled]%

## 2024-01-17 NOTE — H&P PST ADULT - VENOUS THROMBOEMBOLISM BMI
(0) indicator not present Render Risk Assessment In Note?: no Detail Level: Simple Comment: Completed 5 months of Absorica with an accumulative dose of 113mg/kg with ABSTINENCE, from March 22 to July-27-22 (Dr. BERNABE); not at goal dosing.\\n\\nBaseline labs 12-16-23-WNL for isotretinoin; Plan repeat at enc of 2nd month (s/p 1st month at daily dosing)-has lab slip fasting LFT/LP, then PRN\\n\\nWT: 165 lbs/ 75kg (10/12/23) -ABSTINENCE,\\n(2nd Round of isotretinoin)- HIGHER end of GOAL cumulative dose RANGE: (150-220)\\nGoal: 75mg/day -ok to round to 80kg/day per Dr. Machado\\n\\nShe has since completed a Month 1: @ 40mg Daily (1/2 goal dosing)\\nCumulative Dosage: 16 mg/kg-SLR 1-17-24\\n\\nTolerating well.  She is aware isotretinoin will clear her acne, though hormonal acne may flare 6 months post isotretinoin.  She is aware of HC/SPL options.

## 2024-03-21 NOTE — ASU PATIENT PROFILE, ADULT - FALL HARM RISK - PT AGE POPULATION HIDDEN
03/21/2024:  Fredrick: 74 yo F presents for the following concerns: Sx: +Rectal bleed.  Has a h/o hemorrhoids.  No other symptoms.  PCP or other prior workup.  Personal history of colon polsyps.  Last colonoscopy was March 2022 with large colon polyp.  Moved up here from [place], [place] 2 years. SH: Never smoker.  + wine 1-2 glasses nightly. FH: NKFH of CRC.
Adult

## 2024-06-18 ENCOUNTER — NON-APPOINTMENT (OUTPATIENT)
Age: 74
End: 2024-06-18

## 2024-06-18 ENCOUNTER — APPOINTMENT (OUTPATIENT)
Dept: CARDIOLOGY | Facility: CLINIC | Age: 74
End: 2024-06-18
Payer: MEDICARE

## 2024-06-18 VITALS
WEIGHT: 194 LBS | SYSTOLIC BLOOD PRESSURE: 130 MMHG | HEART RATE: 85 BPM | HEIGHT: 68 IN | OXYGEN SATURATION: 98 % | DIASTOLIC BLOOD PRESSURE: 78 MMHG | BODY MASS INDEX: 29.4 KG/M2

## 2024-06-18 DIAGNOSIS — R06.09 OTHER FORMS OF DYSPNEA: ICD-10-CM

## 2024-06-18 DIAGNOSIS — I49.3 VENTRICULAR PREMATURE DEPOLARIZATION: ICD-10-CM

## 2024-06-18 DIAGNOSIS — E78.5 HYPERLIPIDEMIA, UNSPECIFIED: ICD-10-CM

## 2024-06-18 PROCEDURE — 99214 OFFICE O/P EST MOD 30 MIN: CPT

## 2024-06-18 PROCEDURE — G2211 COMPLEX E/M VISIT ADD ON: CPT

## 2024-06-18 PROCEDURE — 93000 ELECTROCARDIOGRAM COMPLETE: CPT

## 2024-06-18 RX ORDER — SIMVASTATIN 20 MG/1
20 TABLET, FILM COATED ORAL
Qty: 90 | Refills: 3 | Status: ACTIVE | COMMUNITY
Start: 2024-06-18 | End: 1900-01-01

## 2024-06-18 NOTE — DISCUSSION/SUMMARY
[___ Month(s)] : in [unfilled] month(s) [EKG obtained to assist in diagnosis and management of assessed problem(s)] : EKG obtained to assist in diagnosis and management of assessed problem(s) [FreeTextEntry1] : 73 year-old female with h/ o ectopy on her 12 lead ECG - ILR monitored at [facility], no reported events s/p ILR explantation. She gained some weight, somewhat less active because of back pain, will increase physical activity. Added mediu potency statin for elevated LDL, goal is <100.

## 2024-06-18 NOTE — HISTORY OF PRESENT ILLNESS
[FreeTextEntry1] : 74 yo female followed for h/o obesity, hyperlipidemia and migraine headaches.  Patient had cardiac evaluation done in the past because her sister has Brugada syndrome. Had c/o heart fluttering and had ILR placed by Dr. Nolan at [facility] [place] in 5/21. Planned explantation in January 2024.  Continued lumbar and thoracic spine pain. Getting PT. Alos began to c/o right shoulder pain and motion limitation, was seen by Ortho and Neurology. While conducting An NCV test, she apparently syncopized, Neurologist suspected some facial asymmetry and she was sent to [facility] for evaluation. No evidence of CVA as per patient.  Last labs show elevated lipid profile.  Feeling well otherwise no other constitutional symptoms.

## 2024-06-18 NOTE — CARDIOLOGY SUMMARY
[___] : [unfilled] [LVEF ___%] : LVEF [unfilled]% [de-identified] : 6/18/24, Sinus Rhythm  Low voltage in precordial leads. -RSR(V1) -nondiagnostic. -Anterior infarct -age undetermined -Old inferior infarct.12/15/23, Sinus Rhythm , -RSR(V1) -possible incomplete right bundle branch block and anterior fascicular block. 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   -RSR(V1) -nondiagnostic.  -Old anterior infarct.  9/2/22, Sinus Rhythm, -RSR(V1) -nondiagnostic. -Left axis -anterior fascicular block. 2/21/22, Sinus rhythm, Left axis/LAHB, RSR (V1)- non diagnostic, no interval changes. 1/25/22, Sinus Rhythm -Frequent pvcs -ventricular bigeminy, Low voltage in precordial leads. -RSR(V1) -nondiagnostic. -Left axis -anterior fascicular block. 7/20/21, Sinus  Rhythm  - frequent ectopic ventricular beats, -RSR(V1) -nondiagnostic.  -Left atrial enlargement.  [de-identified] : 6/16/21 - Medtronic LINQ placed [de-identified] : 4/7/22, Normal ex nuc stress test

## 2024-12-20 ENCOUNTER — NON-APPOINTMENT (OUTPATIENT)
Age: 74
End: 2024-12-20

## 2024-12-20 ENCOUNTER — APPOINTMENT (OUTPATIENT)
Dept: CARDIOLOGY | Facility: CLINIC | Age: 74
End: 2024-12-20
Payer: MEDICARE

## 2024-12-20 VITALS
OXYGEN SATURATION: 97 % | HEART RATE: 87 BPM | WEIGHT: 193 LBS | DIASTOLIC BLOOD PRESSURE: 70 MMHG | HEIGHT: 68 IN | SYSTOLIC BLOOD PRESSURE: 120 MMHG | BODY MASS INDEX: 29.25 KG/M2

## 2024-12-20 DIAGNOSIS — E78.5 HYPERLIPIDEMIA, UNSPECIFIED: ICD-10-CM

## 2024-12-20 DIAGNOSIS — R07.89 OTHER CHEST PAIN: ICD-10-CM

## 2024-12-20 DIAGNOSIS — E03.9 HYPOTHYROIDISM, UNSPECIFIED: ICD-10-CM

## 2024-12-20 PROCEDURE — 99214 OFFICE O/P EST MOD 30 MIN: CPT

## 2024-12-20 PROCEDURE — G2211 COMPLEX E/M VISIT ADD ON: CPT

## 2024-12-20 PROCEDURE — 93000 ELECTROCARDIOGRAM COMPLETE: CPT

## 2025-01-02 ENCOUNTER — APPOINTMENT (OUTPATIENT)
Dept: CARDIOLOGY | Facility: CLINIC | Age: 75
End: 2025-01-02
Payer: MEDICARE

## 2025-01-02 DIAGNOSIS — R07.89 OTHER CHEST PAIN: ICD-10-CM

## 2025-01-02 DIAGNOSIS — R94.31 ABNORMAL ELECTROCARDIOGRAM [ECG] [EKG]: ICD-10-CM

## 2025-01-02 DIAGNOSIS — R06.09 OTHER FORMS OF DYSPNEA: ICD-10-CM

## 2025-01-02 PROCEDURE — 93015 CV STRESS TEST SUPVJ I&R: CPT

## 2025-01-02 PROCEDURE — A9500: CPT

## 2025-01-02 PROCEDURE — 78452 HT MUSCLE IMAGE SPECT MULT: CPT

## 2025-07-01 ENCOUNTER — APPOINTMENT (OUTPATIENT)
Dept: CARDIOLOGY | Facility: CLINIC | Age: 75
End: 2025-07-01

## 2025-08-16 ENCOUNTER — NON-APPOINTMENT (OUTPATIENT)
Age: 75
End: 2025-08-16

## 2025-08-18 ENCOUNTER — APPOINTMENT (OUTPATIENT)
Dept: CARDIOLOGY | Facility: CLINIC | Age: 75
End: 2025-08-18
Payer: MEDICARE

## 2025-08-18 ENCOUNTER — NON-APPOINTMENT (OUTPATIENT)
Age: 75
End: 2025-08-18

## 2025-08-18 VITALS
WEIGHT: 200 LBS | BODY MASS INDEX: 30.31 KG/M2 | DIASTOLIC BLOOD PRESSURE: 80 MMHG | OXYGEN SATURATION: 97 % | SYSTOLIC BLOOD PRESSURE: 130 MMHG | HEART RATE: 82 BPM | HEIGHT: 68 IN

## 2025-08-18 DIAGNOSIS — E78.5 HYPERLIPIDEMIA, UNSPECIFIED: ICD-10-CM

## 2025-08-18 DIAGNOSIS — I25.10 ATHEROSCLEROTIC HEART DISEASE OF NATIVE CORONARY ARTERY W/OUT ANGINA PECTORIS: ICD-10-CM

## 2025-08-18 DIAGNOSIS — R06.83 SNORING: ICD-10-CM

## 2025-08-18 PROCEDURE — 93000 ELECTROCARDIOGRAM COMPLETE: CPT

## 2025-08-18 PROCEDURE — G2211 COMPLEX E/M VISIT ADD ON: CPT

## 2025-08-18 PROCEDURE — 99214 OFFICE O/P EST MOD 30 MIN: CPT

## 2025-09-05 ENCOUNTER — NON-APPOINTMENT (OUTPATIENT)
Age: 75
End: 2025-09-05

## (undated) DEVICE — GOWN LG W TOWEL

## (undated) DEVICE — GOWN TRIMAX XXL

## (undated) DEVICE — GLV 6 PROTEXIS (WHITE)

## (undated) DEVICE — GLV 7 PROTEXIS (WHITE)

## (undated) DEVICE — PREP CHLORAPREP HI-LITE ORANGE 26ML

## (undated) DEVICE — SHOE  POSTOP SOFTIE DARCO M-LG

## (undated) DEVICE — TOURNIQUET CUFF 18" DUAL PORT SINGLE BLADDER W PLC  (BLACK)

## (undated) DEVICE — SAW BLADE MICROAIRE SAGITTAL 9.4MMX25.4MMX0.6MM

## (undated) DEVICE — SOLIDIFIER CANN EXPRESS 3K

## (undated) DEVICE — DRAPE INSTRUMENT POUCH

## (undated) DEVICE — DRSG STOCKINETTE IMPERVIOUS XL

## (undated) DEVICE — DRAPE TOWEL BLUE 17" X 24"

## (undated) DEVICE — ELCTR GROUNDING PAD ADULT COVIDIEN

## (undated) DEVICE — PREP CHLORAPREP ORANGE 2PCT 26ML

## (undated) DEVICE — SOL IRR POUR NS 0.9% 500ML

## (undated) DEVICE — BLADE RECIP CROSS CUT SM

## (undated) DEVICE — PACK LOWER EXTREMITY

## (undated) DEVICE — POSITIONER FOAM HEAD CRADLE (PINK)

## (undated) DEVICE — DRILL BIT SYNTHES ORTHO QC 1.5X85MM

## (undated) DEVICE — DRSG ADAPTIC 3 X 8"

## (undated) DEVICE — WARMING BLANKET UPPER ADULT

## (undated) DEVICE — ELCTR BOVIE TIP BLADE INSULATED 2.75" EDGE

## (undated) DEVICE — SUT POLYSORB 2-0 30" V-20 UNDYED

## (undated) DEVICE — SYR LUER LOK 10CC

## (undated) DEVICE — BLADE SAFETY LOCK #15

## (undated) DEVICE — BLADE SCALPEL SAFETYLOCK #15

## (undated) DEVICE — GLV 7.5 PROTEXIS (BLUE)

## (undated) DEVICE — DRSG ADAPTIC 3X8"

## (undated) DEVICE — GLV 7.5 ESTEEM BLUE

## (undated) DEVICE — SUT MONOSOF 5-0 18" C-13

## (undated) DEVICE — GLV 6 PROTEXIS

## (undated) DEVICE — DRILL BIT SYNTHES ORTHO QC 2X100MM

## (undated) DEVICE — SUT POLYSORB 4-0 30" C-13 UNDYED

## (undated) DEVICE — DRAPE INSTRUMENT POUCH 6.75" X 11"

## (undated) DEVICE — NDL HYPO REGULAR BEVEL 25G X 1.5" (BLUE)

## (undated) DEVICE — VENODYNE/SCD SLEEVE CALF MEDIUM

## (undated) DEVICE — STRYKER INTERPULSE HANDPIECE W IRR SUCTION TUBE

## (undated) DEVICE — DRSG WEBRIL 4"

## (undated) DEVICE — DRILL BIT SYNTHES ORTHO QC 2.7X100MM

## (undated) DEVICE — GLV 6.5 PROTEXIS (WHITE)

## (undated) DEVICE — BLANKET WARMER UPPER ADULT

## (undated) DEVICE — GLV 7.5 PROTEXIS (WHITE)

## (undated) DEVICE — POSITIONER FOAM HEAD CRADLE

## (undated) DEVICE — BLADE FLAT  26 X  6

## (undated) DEVICE — SAW BLADE CONMED LINVATEC RECIPROCATING CROSS CUT SMALL 5.5 X 9.5MM

## (undated) DEVICE — ELCTR EDGE BOVIE INSULATED BLADE TIP 2.75"

## (undated) DEVICE — WRAP COMPRESSION CALF MED

## (undated) DEVICE — CUFF TOURNIQUET 18" DUAL PORT W PLC

## (undated) DEVICE — SUT POLYSORB 3-0 18" C-13 UNDYED

## (undated) DEVICE — GLV 7 PROTEXIS